# Patient Record
Sex: FEMALE | Race: ASIAN | NOT HISPANIC OR LATINO
[De-identification: names, ages, dates, MRNs, and addresses within clinical notes are randomized per-mention and may not be internally consistent; named-entity substitution may affect disease eponyms.]

---

## 2018-09-10 ENCOUNTER — APPOINTMENT (OUTPATIENT)
Dept: OBGYN | Facility: CLINIC | Age: 33
End: 2018-09-10

## 2018-11-15 ENCOUNTER — RESULT REVIEW (OUTPATIENT)
Age: 33
End: 2018-11-15

## 2020-01-08 ENCOUNTER — RESULT REVIEW (OUTPATIENT)
Age: 35
End: 2020-01-08

## 2020-01-21 ENCOUNTER — RESULT REVIEW (OUTPATIENT)
Age: 35
End: 2020-01-21

## 2021-03-10 ENCOUNTER — RESULT REVIEW (OUTPATIENT)
Age: 36
End: 2021-03-10

## 2022-03-16 ENCOUNTER — RESULT REVIEW (OUTPATIENT)
Age: 37
End: 2022-03-16

## 2023-09-18 ENCOUNTER — APPOINTMENT (OUTPATIENT)
Dept: ANTEPARTUM | Facility: CLINIC | Age: 38
End: 2023-09-18
Payer: COMMERCIAL

## 2023-09-18 ENCOUNTER — ASOB RESULT (OUTPATIENT)
Age: 38
End: 2023-09-18

## 2023-09-18 PROCEDURE — 76801 OB US < 14 WKS SINGLE FETUS: CPT

## 2023-09-18 PROCEDURE — 76813 OB US NUCHAL MEAS 1 GEST: CPT

## 2023-11-07 ENCOUNTER — ASOB RESULT (OUTPATIENT)
Age: 38
End: 2023-11-07

## 2023-11-07 ENCOUNTER — APPOINTMENT (OUTPATIENT)
Dept: ANTEPARTUM | Facility: CLINIC | Age: 38
End: 2023-11-07
Payer: COMMERCIAL

## 2023-11-07 PROCEDURE — 76811 OB US DETAILED SNGL FETUS: CPT

## 2024-01-11 ENCOUNTER — APPOINTMENT (OUTPATIENT)
Dept: ANTEPARTUM | Facility: CLINIC | Age: 39
End: 2024-01-11
Payer: COMMERCIAL

## 2024-01-11 ENCOUNTER — ASOB RESULT (OUTPATIENT)
Age: 39
End: 2024-01-11

## 2024-01-11 PROCEDURE — 76816 OB US FOLLOW-UP PER FETUS: CPT

## 2024-01-11 PROCEDURE — 76819 FETAL BIOPHYS PROFIL W/O NST: CPT | Mod: 59

## 2024-02-29 ENCOUNTER — ASOB RESULT (OUTPATIENT)
Age: 39
End: 2024-02-29

## 2024-02-29 ENCOUNTER — APPOINTMENT (OUTPATIENT)
Dept: ANTEPARTUM | Facility: CLINIC | Age: 39
End: 2024-02-29
Payer: COMMERCIAL

## 2024-02-29 PROCEDURE — 76816 OB US FOLLOW-UP PER FETUS: CPT

## 2024-03-22 ENCOUNTER — ASOB RESULT (OUTPATIENT)
Age: 39
End: 2024-03-22

## 2024-03-22 ENCOUNTER — TRANSCRIPTION ENCOUNTER (OUTPATIENT)
Age: 39
End: 2024-03-22

## 2024-03-22 ENCOUNTER — APPOINTMENT (OUTPATIENT)
Dept: ANTEPARTUM | Facility: CLINIC | Age: 39
End: 2024-03-22
Payer: COMMERCIAL

## 2024-03-22 PROCEDURE — 76819 FETAL BIOPHYS PROFIL W/O NST: CPT | Mod: 59

## 2024-03-22 PROCEDURE — 76816 OB US FOLLOW-UP PER FETUS: CPT

## 2024-03-26 ENCOUNTER — INPATIENT (INPATIENT)
Facility: HOSPITAL | Age: 39
LOS: 2 days | Discharge: HOME | End: 2024-03-29
Attending: OBSTETRICS & GYNECOLOGY | Admitting: OBSTETRICS & GYNECOLOGY
Payer: COMMERCIAL

## 2024-03-26 ENCOUNTER — TRANSCRIPTION ENCOUNTER (OUTPATIENT)
Age: 39
End: 2024-03-26

## 2024-03-26 VITALS — DIASTOLIC BLOOD PRESSURE: 79 MMHG | HEART RATE: 88 BPM | SYSTOLIC BLOOD PRESSURE: 116 MMHG

## 2024-03-26 DIAGNOSIS — Z34.80 ENCOUNTER FOR SUPERVISION OF OTHER NORMAL PREGNANCY, UNSPECIFIED TRIMESTER: ICD-10-CM

## 2024-03-26 DIAGNOSIS — O26.899 OTHER SPECIFIED PREGNANCY RELATED CONDITIONS, UNSPECIFIED TRIMESTER: ICD-10-CM

## 2024-03-26 LAB
BASOPHILS # BLD AUTO: 0.02 K/UL — SIGNIFICANT CHANGE UP (ref 0–0.2)
BASOPHILS NFR BLD AUTO: 0.2 % — SIGNIFICANT CHANGE UP (ref 0–2)
BLD GP AB SCN SERPL QL: NEGATIVE — SIGNIFICANT CHANGE UP
EOSINOPHIL # BLD AUTO: 0.03 K/UL — SIGNIFICANT CHANGE UP (ref 0–0.5)
EOSINOPHIL NFR BLD AUTO: 0.3 % — SIGNIFICANT CHANGE UP (ref 0–6)
HCT VFR BLD CALC: 36.8 % — SIGNIFICANT CHANGE UP (ref 34.5–45)
HGB BLD-MCNC: 12.5 G/DL — SIGNIFICANT CHANGE UP (ref 11.5–15.5)
IMM GRANULOCYTES NFR BLD AUTO: 0.6 % — SIGNIFICANT CHANGE UP (ref 0–0.9)
LYMPHOCYTES # BLD AUTO: 1.49 K/UL — SIGNIFICANT CHANGE UP (ref 1–3.3)
LYMPHOCYTES # BLD AUTO: 13.6 % — SIGNIFICANT CHANGE UP (ref 13–44)
MCHC RBC-ENTMCNC: 30.7 PG — SIGNIFICANT CHANGE UP (ref 27–34)
MCHC RBC-ENTMCNC: 34 GM/DL — SIGNIFICANT CHANGE UP (ref 32–36)
MCV RBC AUTO: 90.4 FL — SIGNIFICANT CHANGE UP (ref 80–100)
MONOCYTES # BLD AUTO: 0.64 K/UL — SIGNIFICANT CHANGE UP (ref 0–0.9)
MONOCYTES NFR BLD AUTO: 5.9 % — SIGNIFICANT CHANGE UP (ref 2–14)
NEUTROPHILS # BLD AUTO: 8.68 K/UL — HIGH (ref 1.8–7.4)
NEUTROPHILS NFR BLD AUTO: 79.4 % — HIGH (ref 43–77)
NRBC # BLD: 0 /100 WBCS — SIGNIFICANT CHANGE UP (ref 0–0)
PLATELET # BLD AUTO: 156 K/UL — SIGNIFICANT CHANGE UP (ref 150–400)
RBC # BLD: 4.07 M/UL — SIGNIFICANT CHANGE UP (ref 3.8–5.2)
RBC # FLD: 13.2 % — SIGNIFICANT CHANGE UP (ref 10.3–14.5)
RH IG SCN BLD-IMP: POSITIVE — SIGNIFICANT CHANGE UP
RH IG SCN BLD-IMP: POSITIVE — SIGNIFICANT CHANGE UP
WBC # BLD: 10.93 K/UL — HIGH (ref 3.8–10.5)
WBC # FLD AUTO: 10.93 K/UL — HIGH (ref 3.8–10.5)

## 2024-03-26 RX ORDER — CHLORHEXIDINE GLUCONATE 213 G/1000ML
1 SOLUTION TOPICAL DAILY
Refills: 0 | Status: DISCONTINUED | OUTPATIENT
Start: 2024-03-26 | End: 2024-03-27

## 2024-03-26 RX ORDER — SODIUM CHLORIDE 9 MG/ML
1000 INJECTION, SOLUTION INTRAVENOUS
Refills: 0 | Status: DISCONTINUED | OUTPATIENT
Start: 2024-03-26 | End: 2024-03-29

## 2024-03-26 RX ORDER — SODIUM CHLORIDE 9 MG/ML
1000 INJECTION, SOLUTION INTRAVENOUS
Refills: 0 | Status: DISCONTINUED | OUTPATIENT
Start: 2024-03-26 | End: 2024-03-27

## 2024-03-26 RX ORDER — OXYTOCIN 10 UNIT/ML
333.33 VIAL (ML) INJECTION
Qty: 20 | Refills: 0 | Status: DISCONTINUED | OUTPATIENT
Start: 2024-03-26 | End: 2024-03-29

## 2024-03-26 RX ORDER — CITRIC ACID/SODIUM CITRATE 300-500 MG
15 SOLUTION, ORAL ORAL EVERY 6 HOURS
Refills: 0 | Status: DISCONTINUED | OUTPATIENT
Start: 2024-03-26 | End: 2024-03-27

## 2024-03-26 RX ADMIN — Medication 0.25 MILLIGRAM(S): at 22:44

## 2024-03-26 RX ADMIN — Medication 0.25 MILLIGRAM(S): at 22:42

## 2024-03-26 RX ADMIN — SODIUM CHLORIDE 125 MILLILITER(S): 9 INJECTION, SOLUTION INTRAVENOUS at 15:00

## 2024-03-26 NOTE — OB PROVIDER H&P - ASSESSMENT
A/P: 37 y/o G 1 P 0 @39.4 wks ([place] 3/329) admitted for IOL with PROM@11a.  - Admit to L&D  - Routine labs, IVF, NPO  - EFM: Cat I, continuous monitoring  - GBS: negative  - IOL with buccal cytotec  - Anesthesia consult  - Discussed with Dr. Garfield Corrigan, PAABEL

## 2024-03-26 NOTE — OB RN TRIAGE NOTE - FALL HARM RISK - UNIVERSAL INTERVENTIONS
Bed in lowest position, wheels locked, appropriate side rails in place/Call bell, personal items and telephone in reach/Instruct patient to call for assistance before getting out of bed or chair/Non-slip footwear when patient is out of bed/[place] to call system/Physically safe environment - no spills, clutter or unnecessary equipment/Purposeful Proactive Rounding/Room/bathroom lighting operational, light cord in reach

## 2024-03-26 NOTE — OB PROVIDER H&P - NS PANP COMMENT GEN_ALL_CORE FT
Ob Attg Note  I agree w/ admission H+P as entered above.  Pt is a primagravida at 39w4d, presenting w/ SROM and in early prodromal labor.  will admit pt for induction of labor, starting w/ buccal misoprostol.  pt and her partner were counseled and all q's were answered.

## 2024-03-26 NOTE — OB RN PATIENT PROFILE - FUNCTIONAL ASSESSMENT - DAILY ACTIVITY 6.
Benefits, risks, and possible complications of procedure explained to patient/caregiver who verbalized understanding and gave written consent. 4 = No assist / stand by assistance

## 2024-03-26 NOTE — OB PROVIDER H&P - HISTORY OF PRESENT ILLNESS
OB PA Admission Note    37 y/o  @39.4wks ([place] 3/29) admitted for IOL with PROM@11am. Pt reports large gush of fluid with persistent trickles of fluid throughout the day. Reports irregular ctx. Denies VB. +FM. GBS negative. EFW 3300g.     PNC: uncomplicated   ObHx: Primigravida   GynHx: Small posterior fibroid. Denies hx of ovarian cysts, abnml PAP smears, STDs  MedHx: Denies hx of HTN, DM, asthma, thyroid problems, blood clots/bleeding problems, hx of blood transfusions  Meds: PNV  All: NKDA  PSHx: Denies hx of abdominal surgery  FHx: Denies hx of blood clots/bleeding problems  Social: Denies alcohol/tobacco/drug use in pregnancy  Psych: Denies hx of anxiety/depression

## 2024-03-26 NOTE — OB RN TRIAGE NOTE - SUICIDE SCREENING QUESTION 1
Feels well  Denies LOF/CTX/VB  No concerns  Discussed fetal kick counting  Does not want to do her vaginal massages despite recommendations 
No

## 2024-03-26 NOTE — OB PROVIDER H&P - NSHPPHYSICALEXAM_GEN_ALL_CORE
Vital Signs Last 24 Hrs  T(C): --  T(F): --  HR: 88 (26 Mar 2024 14:01) (88 - 88)  BP: 116/79 (26 Mar 2024 14:01) (116/79 - 116/79)  BP(mean): --  RR: 16 (26 Mar 2024 14:01) (16 - 16)  SpO2: --    Parameters below as of 26 Mar 2024 14:01  Patient On (Oxygen Delivery Method): room air    Gen: NAD  CV: NRRR  Lungs: CTA  Abd: soft, gravid, non-tender  SSE: +pooling, +nitrazine, +ferning    SVE: 0.5/50/-3  EFM: 120bpm, moderate variability, +accels, -decels  [place]: irregular  BSUS: vtx

## 2024-03-26 NOTE — OB RN PATIENT PROFILE - PRO MENTAL HEALTH SX RECENT
normal,  alert,  in no acute distress,  well developed, well nourished,  ambulating without difficulty,  normal communication ability
none

## 2024-03-27 LAB — T PALLIDUM AB TITR SER: NEGATIVE — SIGNIFICANT CHANGE UP

## 2024-03-27 RX ORDER — NALBUPHINE HYDROCHLORIDE 10 MG/ML
2.5 INJECTION, SOLUTION INTRAMUSCULAR; INTRAVENOUS; SUBCUTANEOUS EVERY 6 HOURS
Refills: 0 | Status: DISCONTINUED | OUTPATIENT
Start: 2024-03-27 | End: 2024-03-28

## 2024-03-27 RX ORDER — OXYCODONE HYDROCHLORIDE 5 MG/1
5 TABLET ORAL ONCE
Refills: 0 | Status: DISCONTINUED | OUTPATIENT
Start: 2024-03-27 | End: 2024-03-29

## 2024-03-27 RX ORDER — MAGNESIUM HYDROXIDE 400 MG/1
30 TABLET, CHEWABLE ORAL
Refills: 0 | Status: DISCONTINUED | OUTPATIENT
Start: 2024-03-27 | End: 2024-03-29

## 2024-03-27 RX ORDER — OXYCODONE HYDROCHLORIDE 5 MG/1
10 TABLET ORAL
Refills: 0 | Status: DISCONTINUED | OUTPATIENT
Start: 2024-03-27 | End: 2024-03-28

## 2024-03-27 RX ORDER — IBUPROFEN 200 MG
600 TABLET ORAL EVERY 6 HOURS
Refills: 0 | Status: COMPLETED | OUTPATIENT
Start: 2024-03-27 | End: 2025-02-23

## 2024-03-27 RX ORDER — TETANUS TOXOID, REDUCED DIPHTHERIA TOXOID AND ACELLULAR PERTUSSIS VACCINE, ADSORBED 5; 2.5; 8; 8; 2.5 [IU]/.5ML; [IU]/.5ML; UG/.5ML; UG/.5ML; UG/.5ML
0.5 SUSPENSION INTRAMUSCULAR ONCE
Refills: 0 | Status: DISCONTINUED | OUTPATIENT
Start: 2024-03-27 | End: 2024-03-29

## 2024-03-27 RX ORDER — LANOLIN
1 OINTMENT (GRAM) TOPICAL EVERY 6 HOURS
Refills: 0 | Status: DISCONTINUED | OUTPATIENT
Start: 2024-03-27 | End: 2024-03-29

## 2024-03-27 RX ORDER — DIPHENHYDRAMINE HCL 50 MG
25 CAPSULE ORAL EVERY 6 HOURS
Refills: 0 | Status: DISCONTINUED | OUTPATIENT
Start: 2024-03-27 | End: 2024-03-29

## 2024-03-27 RX ORDER — KETOROLAC TROMETHAMINE 30 MG/ML
30 SYRINGE (ML) INJECTION EVERY 6 HOURS
Refills: 0 | Status: DISCONTINUED | OUTPATIENT
Start: 2024-03-27 | End: 2024-03-29

## 2024-03-27 RX ORDER — SODIUM CHLORIDE 9 MG/ML
1000 INJECTION, SOLUTION INTRAVENOUS
Refills: 0 | Status: DISCONTINUED | OUTPATIENT
Start: 2024-03-27 | End: 2024-03-29

## 2024-03-27 RX ORDER — HEPARIN SODIUM 5000 [USP'U]/ML
5000 INJECTION INTRAVENOUS; SUBCUTANEOUS EVERY 12 HOURS
Refills: 0 | Status: DISCONTINUED | OUTPATIENT
Start: 2024-03-27 | End: 2024-03-29

## 2024-03-27 RX ORDER — NALOXONE HYDROCHLORIDE 4 MG/.1ML
0.1 SPRAY NASAL
Refills: 0 | Status: DISCONTINUED | OUTPATIENT
Start: 2024-03-27 | End: 2024-03-28

## 2024-03-27 RX ORDER — OXYTOCIN 10 UNIT/ML
333.33 VIAL (ML) INJECTION
Qty: 20 | Refills: 0 | Status: DISCONTINUED | OUTPATIENT
Start: 2024-03-27 | End: 2024-03-29

## 2024-03-27 RX ORDER — OXYCODONE HYDROCHLORIDE 5 MG/1
5 TABLET ORAL
Refills: 0 | Status: DISCONTINUED | OUTPATIENT
Start: 2024-03-27 | End: 2024-03-28

## 2024-03-27 RX ORDER — ACETAMINOPHEN 500 MG
975 TABLET ORAL
Refills: 0 | Status: DISCONTINUED | OUTPATIENT
Start: 2024-03-27 | End: 2024-03-29

## 2024-03-27 RX ORDER — CITRIC ACID/SODIUM CITRATE 300-500 MG
30 SOLUTION, ORAL ORAL ONCE
Refills: 0 | Status: DISCONTINUED | OUTPATIENT
Start: 2024-03-27 | End: 2024-03-29

## 2024-03-27 RX ORDER — DEXAMETHASONE 0.5 MG/5ML
4 ELIXIR ORAL EVERY 6 HOURS
Refills: 0 | Status: DISCONTINUED | OUTPATIENT
Start: 2024-03-27 | End: 2024-03-28

## 2024-03-27 RX ORDER — ONDANSETRON 8 MG/1
4 TABLET, FILM COATED ORAL EVERY 6 HOURS
Refills: 0 | Status: DISCONTINUED | OUTPATIENT
Start: 2024-03-27 | End: 2024-03-28

## 2024-03-27 RX ORDER — SODIUM CHLORIDE 9 MG/ML
500 INJECTION, SOLUTION INTRAVENOUS ONCE
Refills: 0 | Status: DISCONTINUED | OUTPATIENT
Start: 2024-03-27 | End: 2024-03-29

## 2024-03-27 RX ORDER — FAMOTIDINE 10 MG/ML
20 INJECTION INTRAVENOUS ONCE
Refills: 0 | Status: DISCONTINUED | OUTPATIENT
Start: 2024-03-27 | End: 2024-03-29

## 2024-03-27 RX ORDER — MORPHINE SULFATE 50 MG/1
2 CAPSULE, EXTENDED RELEASE ORAL ONCE
Refills: 0 | Status: DISCONTINUED | OUTPATIENT
Start: 2024-03-27 | End: 2024-03-28

## 2024-03-27 RX ORDER — OXYCODONE HYDROCHLORIDE 5 MG/1
5 TABLET ORAL
Refills: 0 | Status: DISCONTINUED | OUTPATIENT
Start: 2024-03-27 | End: 2024-03-29

## 2024-03-27 RX ORDER — SIMETHICONE 80 MG/1
80 TABLET, CHEWABLE ORAL EVERY 4 HOURS
Refills: 0 | Status: DISCONTINUED | OUTPATIENT
Start: 2024-03-27 | End: 2024-03-29

## 2024-03-27 RX ADMIN — ONDANSETRON 4 MILLIGRAM(S): 8 TABLET, FILM COATED ORAL at 10:04

## 2024-03-27 RX ADMIN — Medication 30 MILLIGRAM(S): at 23:37

## 2024-03-27 RX ADMIN — Medication 30 MILLIGRAM(S): at 11:36

## 2024-03-27 RX ADMIN — Medication 975 MILLIGRAM(S): at 10:45

## 2024-03-27 RX ADMIN — Medication 975 MILLIGRAM(S): at 16:27

## 2024-03-27 RX ADMIN — Medication 30 MILLIGRAM(S): at 18:09

## 2024-03-27 RX ADMIN — Medication 975 MILLIGRAM(S): at 21:40

## 2024-03-27 RX ADMIN — Medication 30 MILLIGRAM(S): at 12:30

## 2024-03-27 RX ADMIN — HEPARIN SODIUM 5000 UNIT(S): 5000 INJECTION INTRAVENOUS; SUBCUTANEOUS at 21:08

## 2024-03-27 RX ADMIN — Medication 975 MILLIGRAM(S): at 10:05

## 2024-03-27 RX ADMIN — Medication 975 MILLIGRAM(S): at 21:07

## 2024-03-27 NOTE — OB RN DELIVERY SUMMARY - NS_SEPSISRSKCALC_OBGYN_ALL_OB_FT
Rupture of membranes must be entered above.   EOS calculated successfully. EOS Risk Factor: 0.5/1000 live births ([facility] national incidence); GA=39w5d; Temp=98.6; ROM=7.2; GBS='Negative'; Antibiotics='No antibiotics or any antibiotics < 2 hrs prior to birth'

## 2024-03-27 NOTE — OB NEONATOLOGY/PEDIATRICIAN DELIVERY SUMMARY - NSPEDSNEONOTESA_OBGYN_ALL_OB_FT
Requested by OB to attend this primary unscheduled  delivery at 39.5 weeks for category II tracing. Mother is a 38 year old,  , blood type  O  pos.  Prenatal labs as follow: HIV neg, RPR non-reactive, rubella immune, HBsA neg, GBS neg on 3/8/24.  Maternal history significant for small posterior fibroid. Prenatal history significant for none. This pregnancy was complicated by AMA. SROM at 11:00 AM on 3/26 - with clear fluid - ~ 19 hours prior to delivery. Baby emerged prior to peds arrival. Evaluated under warmer. Dried, suctioned and stimulated. Apgars 9/9. Infant admitted to NBN for routine care. Parents updated in L&D. EOS 0.18.

## 2024-03-27 NOTE — OB PROVIDER LABOR PROGRESS NOTE - ASSESSMENT
Pt is a 37yo  admitted for IOL for PROM now with ISE in place.    - Continue cont EFM, toco, IVF  - Continue resuscitation and maternal repositioning  - LR 500cc bolus    D/w Dr. Garfield Garcia MD PGY1
A/P 38y P0 @ 39/4 wks IOL PROM@11a  -IOL: Continue on Buccal Cytotec as tracing allows   -Cat 1 tracing  -Analgesia : Epidural desired as patient is very uncomfortable   -Anticipate     d/w Dr. Merrill in house   Michell West PGY-4  
Pt is a 39yo  admitted for IOL for PROM.    - Continue cont EFM, toco, IVF  - sp terbutaline x2    Dr. Antonio in room during evaluation    Maryjo Garcia MD PGY1
Plan: 38y y/o  @ 39w4d. IOL for PROM.   - s/p ephedrine by anesthesia for low BPs (80-90s/40-50s)  - Continuous EFM, [place]  - IVF bolus    d/w attending physician Dr. Garfield Abreu MD  PGY-2

## 2024-03-27 NOTE — OB RN DELIVERY SUMMARY - AMNIOTIC FLUID AMOUNT, LABOR
[FreeTextEntry1] : May 2019 he had been concerned about a lump on the posterior right shoulder.\par Clinically 2 cm, fleshy, mobile, and nontender.\par \par \par Rx for repeat left thigh MRI ordered for surveillance\par \par \par 08-01-20:\par Left lower extremity MRI at 450.\par No change since October 2016.\par Stable mild enlargement of the mid to distal left sciatic nerve at site of prior schwannoma, presumably related to postsurgical changes.\par August 7, 20/20, we spoke.\par Review of the above information.\par He will see me either later this year, or early next year.\par \par \par 3/26/2021.\par I called the patient but had to leave a message on his mailbox, and home number.\par Earlier this week the patient had seen Dr. Jensen Nguyen.\par The patient apparently has a tender lump on the outer right arm.\par My office will call to schedule a follow-up visit with us.
within normal limits

## 2024-03-27 NOTE — OB PROVIDER DELIVERY SUMMARY - NSPROVIDERDELIVERYNOTE_OBGYN_ALL_OB_FT
Non-scheduled urgent pLTCS for category 2 tracing  Viable vertex male infant, apgars 9/9  Hysterotomy closed in 1 layer using vicryl  Grossly normal uterus, tubes, and ovaries  Abdomen closed in standard fashion  Pt and infant to recovery in stable condition  Dictation #  QBL: 314 mL        IVF: 1500 mL        UOP: 150 mL Non-scheduled urgent pLTCS for category 2 tracing  Viable vertex male infant, apgars 9/9  Hysterotomy closed in 1 layer using vicryl  Grossly normal uterus, tubes, and ovaries  Abdomen closed in standard fashion  Pt and infant to recovery in stable condition  Dictation #[id number]  QBL: 314 mL        IVF: 1500 mL        UOP: 150 mL Non-scheduled urgent pLTCS for category 2 tracing  Viable vertex male infant, apgars 9/9  Hysterotomy closed in 1 layer using vicryl  Grossly normal uterus, tubes, and ovaries  Abdomen closed in standard fashion  Pt and infant to recovery in stable condition  Dictation #[id number]  QBL: 314 mL        IVF: 1500 mL        UOP: 150 mL    Ob Attg Note  I agree w/ above.  uncomplicated primary CS delivery

## 2024-03-27 NOTE — OB PROVIDER DELIVERY SUMMARY - NSSELHIDDEN_OBGYN_ALL_OB_FT
[NS_DeliveryAttending1_OBGYN_ALL_OB_FT:[id number]==],[NS_DeliveryRN_OBGYN_ALL_OB_FT:[id number]],[NS_DeliveryAssist1_OBGYN_ALL_OB_FT:[id number]=]

## 2024-03-27 NOTE — OB PROVIDER DELIVERY SUMMARY - NSNUMBEROFNEWBORNS_OBGYN_ALL_OB_NU
Patient Instructions by Elvia Moon MD at 05/09/18 03:34 PM     Author:  Elvia Moon MD Service:  (none) Author Type:  Physician     Filed:  05/09/18 03:35 PM Encounter Date:  5/9/2018 Status:  Signed     :  Elvia Moon MD (Physician)            Risks, benefits, and side effects of medication discussed with patient  Encouraged patient to continue with psychotherapy  Call clinic as needed [phone]  For acute crisis or suicidal ideation call crisis line or go to emergency department  Return to clinic in 2 weeks     Additional Educational Resources:  For additional resources regarding your symptoms, diagnosis, or further health information, please visit the Health Resources section on [email or web address] or the Online Health Resources section in [email or web address].              Revision History        User Key Date/Time User Provider Type Action    > [N/A] 05/09/18 03:35 PM Elvia Moon MD Physician Sign             1

## 2024-03-27 NOTE — OB RN DELIVERY SUMMARY - NSSELHIDDEN_OBGYN_ALL_OB_FT
[NS_DeliveryAttending1_OBGYN_ALL_OB_FT:[id number]==],[NS_DeliveryRN_OBGYN_ALL_OB_FT:[id number]=]

## 2024-03-27 NOTE — OB PROVIDER LABOR PROGRESS NOTE - NS_SUBJECTIVE/OBJECTIVE_OBGYN_ALL_OB_FT
R4 Labor Note    S: Patient evaluated at bedside for cervical change.     O:  T(C): 37.0 (03-26-24 @ 18:48), Max: 37.0 (03-26-24 @ 18:48)  HR: 65 (03-26-24 @ 19:13) (62 - 88)  BP: 134/72 (03-26-24 @ 18:45) (116/79 - 134/72)  RR: 16 (03-26-24 @ 14:19) (16 - 16)  SpO2: 100% (03-26-24 @ 19:13) (98% - 100%)
R2 Labor & Delivery Progress Note     Pt seen & examined at bedside for vaginal exam. Patient immediately post epidural.    T(C): 37 (03-26-24 @ 19:35), Max: 37.0 (03-26-24 @ 18:48)  HR: 71 (03-26-24 @ 20:10) (57 - 88)  BP: 102/54 (03-26-24 @ 20:10) (93/50 - 134/72)  RR: 16 (03-26-24 @ 19:35) (16 - 16)  SpO2: 100% (03-26-24 @ 20:06) (91% - 100%)
S:  Pt seen and examined for 4min prolonged deceleration. Pt repositioned and resuscitated. Uterus found to be firm. Terbutaline x1 administered. Uterus still found to be firm with deceleration persisting. One additional dose terbutaline administered. FHR returned to baseline    O:  Vital Signs Last 24 Hrs  T(C): 37.0 (26 Mar 2024 21:44), Max: 37.0 (26 Mar 2024 18:48)  T(F): 98.6 (26 Mar 2024 21:44), Max: 98.6 (26 Mar 2024 18:48)  HR: 69 (26 Mar 2024 22:46) (57 - 88)  BP: 108/55 (26 Mar 2024 22:46) (87/42 - 138/61)  BP(mean): --  RR: 16 (26 Mar 2024 21:44) (16 - 16)  SpO2: 100% (26 Mar 2024 22:46) (91% - 100%)    Parameters below as of 26 Mar 2024 14:19  Patient On (Oxygen Delivery Method): room air
S:  Pt seen and examined for 4min prolonged deceleration. Pt repositioned and 500cc LR bolus given. ISE placed in usual fashion.    O:  Vital Signs Last 24 Hrs  T(C): 37.0 (27 Mar 2024 01:00), Max: 37.0 (26 Mar 2024 18:48)  T(F): 98.6 (27 Mar 2024 01:00), Max: 98.6 (26 Mar 2024 18:48)  HR: 67 (27 Mar 2024 04:57) (57 - 111)  BP: 103/53 (27 Mar 2024 04:50) (85/47 - 138/61)  BP(mean): --  RR: 16 (27 Mar 2024 01:00) (16 - 16)  SpO2: 99% (27 Mar 2024 04:57) (91% - 100%)    Parameters below as of 26 Mar 2024 14:19  Patient On (Oxygen Delivery Method): room air

## 2024-03-27 NOTE — OB RN INTRAOPERATIVE NOTE - NSSELHIDDEN_OBGYN_ALL_OB_FT
[NS_DeliveryAttending1_OBGYN_ALL_OB_FT:[id number]==],[NS_DeliveryRN_OBGYN_ALL_OB_FT:[id number]] [NS_DeliveryAttending1_OBGYN_ALL_OB_FT:[id number]],[NS_DeliveryRN_OBGYN_ALL_OB_FT:[id number]],[NS_DeliveryAssist1_OBGYN_ALL_OB_FT:[id number]=]

## 2024-03-27 NOTE — OB PROVIDER LABOR PROGRESS NOTE - NS_OBIHIFHRDETAILS_OBGYN_ALL_OB_FT
Baseline 120, mod variability, +accels, -decels
5-6 min late onset decel
Baseline: 130 bpm, moderate variability,  + accels, + 4min prolonged decel
Baseline: 150 bpm, moderate variability,  - accels, + 4min decel

## 2024-03-28 ENCOUNTER — TRANSCRIPTION ENCOUNTER (OUTPATIENT)
Age: 39
End: 2024-03-28

## 2024-03-28 LAB
BASOPHILS # BLD AUTO: 0.02 K/UL — SIGNIFICANT CHANGE UP (ref 0–0.2)
BASOPHILS NFR BLD AUTO: 0.2 % — SIGNIFICANT CHANGE UP (ref 0–2)
EOSINOPHIL # BLD AUTO: 0.05 K/UL — SIGNIFICANT CHANGE UP (ref 0–0.5)
EOSINOPHIL NFR BLD AUTO: 0.4 % — SIGNIFICANT CHANGE UP (ref 0–6)
HCT VFR BLD CALC: 29.5 % — LOW (ref 34.5–45)
HGB BLD-MCNC: 9.8 G/DL — LOW (ref 11.5–15.5)
IMM GRANULOCYTES NFR BLD AUTO: 0.6 % — SIGNIFICANT CHANGE UP (ref 0–0.9)
LYMPHOCYTES # BLD AUTO: 19.5 % — SIGNIFICANT CHANGE UP (ref 13–44)
LYMPHOCYTES # BLD AUTO: 2.43 K/UL — SIGNIFICANT CHANGE UP (ref 1–3.3)
MCHC RBC-ENTMCNC: 30.8 PG — SIGNIFICANT CHANGE UP (ref 27–34)
MCHC RBC-ENTMCNC: 33.2 GM/DL — SIGNIFICANT CHANGE UP (ref 32–36)
MCV RBC AUTO: 92.8 FL — SIGNIFICANT CHANGE UP (ref 80–100)
MONOCYTES # BLD AUTO: 0.74 K/UL — SIGNIFICANT CHANGE UP (ref 0–0.9)
MONOCYTES NFR BLD AUTO: 5.9 % — SIGNIFICANT CHANGE UP (ref 2–14)
NEUTROPHILS # BLD AUTO: 9.13 K/UL — HIGH (ref 1.8–7.4)
NEUTROPHILS NFR BLD AUTO: 73.4 % — SIGNIFICANT CHANGE UP (ref 43–77)
NRBC # BLD: 0 /100 WBCS — SIGNIFICANT CHANGE UP (ref 0–0)
PLATELET # BLD AUTO: 126 K/UL — LOW (ref 150–400)
RBC # BLD: 3.18 M/UL — LOW (ref 3.8–5.2)
RBC # FLD: 13.4 % — SIGNIFICANT CHANGE UP (ref 10.3–14.5)
WBC # BLD: 12.45 K/UL — HIGH (ref 3.8–10.5)
WBC # FLD AUTO: 12.45 K/UL — HIGH (ref 3.8–10.5)

## 2024-03-28 RX ORDER — IBUPROFEN 200 MG
600 TABLET ORAL EVERY 6 HOURS
Refills: 0 | Status: DISCONTINUED | OUTPATIENT
Start: 2024-03-28 | End: 2024-03-29

## 2024-03-28 RX ORDER — LANOLIN
1 OINTMENT (GRAM) TOPICAL
Qty: 0 | Refills: 0 | DISCHARGE
Start: 2024-03-28

## 2024-03-28 RX ORDER — ACETAMINOPHEN 500 MG
3 TABLET ORAL
Qty: 0 | Refills: 0 | DISCHARGE
Start: 2024-03-28

## 2024-03-28 RX ORDER — IBUPROFEN 200 MG
1 TABLET ORAL
Qty: 0 | Refills: 0 | DISCHARGE
Start: 2024-03-28

## 2024-03-28 RX ADMIN — Medication 975 MILLIGRAM(S): at 16:20

## 2024-03-28 RX ADMIN — Medication 30 MILLIGRAM(S): at 12:41

## 2024-03-28 RX ADMIN — Medication 975 MILLIGRAM(S): at 15:50

## 2024-03-28 RX ADMIN — HEPARIN SODIUM 5000 UNIT(S): 5000 INJECTION INTRAVENOUS; SUBCUTANEOUS at 21:59

## 2024-03-28 RX ADMIN — Medication 975 MILLIGRAM(S): at 03:20

## 2024-03-28 RX ADMIN — Medication 30 MILLIGRAM(S): at 18:20

## 2024-03-28 RX ADMIN — Medication 30 MILLIGRAM(S): at 05:40

## 2024-03-28 RX ADMIN — Medication 975 MILLIGRAM(S): at 10:04

## 2024-03-28 RX ADMIN — Medication 975 MILLIGRAM(S): at 21:30

## 2024-03-28 RX ADMIN — Medication 975 MILLIGRAM(S): at 02:35

## 2024-03-28 RX ADMIN — Medication 30 MILLIGRAM(S): at 13:11

## 2024-03-28 RX ADMIN — HEPARIN SODIUM 5000 UNIT(S): 5000 INJECTION INTRAVENOUS; SUBCUTANEOUS at 10:04

## 2024-03-28 RX ADMIN — Medication 975 MILLIGRAM(S): at 20:48

## 2024-03-28 RX ADMIN — Medication 600 MILLIGRAM(S): at 23:46

## 2024-03-28 RX ADMIN — Medication 975 MILLIGRAM(S): at 10:34

## 2024-03-28 RX ADMIN — Medication 30 MILLIGRAM(S): at 05:10

## 2024-03-28 RX ADMIN — Medication 30 MILLIGRAM(S): at 00:06

## 2024-03-28 NOTE — DISCHARGE NOTE OB - PATIENT PORTAL LINK FT
You can access the FollowMyHealth Patient Portal offered by [facility] by registering at the following website: [email or web address]. By joining [facility]’s FollowMyHealth portal, you will also be able to view your health information using other applications (apps) compatible with our system.

## 2024-03-28 NOTE — DISCHARGE NOTE OB - ADDITIONAL INSTRUCTIONS
Postpartum visit via telemed at 2 weeks and then an in office visit between 4-6 weeks.  Call for any issues or concerns. Please do not drive unless you are completely pain free or not requiring any narcotics. Postpartum office visit between 4-6 weeks.  Call for any issues or concerns. Please do not drive unless you are completely pain free or not requiring any narcotics.

## 2024-03-28 NOTE — PROGRESS NOTE ADULT - ASSESSMENT
A/P: 37yo POD#1 s/p pLTCS due to cat II.  Patient is stable and doing well post-operatively.    - Continue regular diet  - Increase ambulation  - Continue motrin, tylenol, oxycodone PRN for pain control.    - AM H/H stable    Keyla Anderson MD PGY1

## 2024-03-28 NOTE — PROGRESS NOTE ADULT - SUBJECTIVE AND OBJECTIVE BOX
OB Progress Note:  Delivery, POD#1    S: 39yo POD#1 s/p pLTCS due to cat II . Pain well controlled, tolerating regular diet, not yet passing flatus, ambulating without difficulty, voiding spontaneously. Denies heavy vaginal bleeding, N/V, CP/SOB/lightheadedness/dizziness.     O:   Vital Signs Last 24 Hrs  T(C): 36.7 (28 Mar 2024 05:25), Max: 36.9 (27 Mar 2024 07:00)  T(F): 98.1 (28 Mar 2024 05:25), Max: 98.4 (27 Mar 2024 07:00)  HR: 65 (28 Mar 2024 05:25) (56 - 80)  BP: 99/63 (28 Mar 2024 05:25) (99/63 - 137/60)  BP(mean): 86 (27 Mar 2024 09:00) (77 - 86)  RR: 18 (28 Mar 2024 05:25) (16 - 18)  SpO2: 99% (28 Mar 2024 05:25) (95% - 99%)    Parameters below as of 28 Mar 2024 05:25  Patient On (Oxygen Delivery Method): room air        Labs:  Blood type: O Positive  Rubella IgG: RPR: Negative                          9.8<L>   12.45<H> >-----------< 126<L>    (  @ 06:21 )             29.5<L>                        12.5   10.93<H> >-----------< 156    (  @ 15:24 )             36.8                  PE:  General: NAD  CV: RRR  Resp: CTAB  Abdomen: Mildly distended, generalized tenderness upon palpation of abd, Fundus firm, incision c/d/i.  : Nl lochia  Extremities: No erythema, no pitting edema     OB Progress Note:  Delivery, POD#1    S: 37yo POD#1 s/p pLTCS due to cat II . Pain well controlled, tolerating regular diet, not yet passing flatus, ambulating without difficulty, voiding spontaneously. Denies heavy vaginal bleeding, N/V, CP/SOB/lightheadedness/dizziness.     O:   Vital Signs Last 24 Hrs  T(C): 36.7 (28 Mar 2024 05:25), Max: 36.9 (27 Mar 2024 07:00)  T(F): 98.1 (28 Mar 2024 05:25), Max: 98.4 (27 Mar 2024 07:00)  HR: 65 (28 Mar 2024 05:25) (56 - 80)  BP: 99/63 (28 Mar 2024 05:25) (99/63 - 137/60)  BP(mean): 86 (27 Mar 2024 09:00) (77 - 86)  RR: 18 (28 Mar 2024 05:25) (16 - 18)  SpO2: 99% (28 Mar 2024 05:25) (95% - 99%)    Parameters below as of 28 Mar 2024 05:25  Patient On (Oxygen Delivery Method): room air        Labs:  Blood type: O Positive  Rubella IgG: RPR: Negative                          9.8<L>   12.45<H> >-----------< 126<L>    (  @ 06:21 )             29.5<L>                        12.5   10.93<H> >-----------< 156    (  @ 15:24 )             36.8                  PE:  General: NAD  CV: RRR  Resp: CTAB  Abdomen: Mildly distended, appropriately tender, Fundus firm, incision c/d/i.  : Nl lochia  Extremities: No erythema, no pitting edema

## 2024-03-28 NOTE — DISCHARGE NOTE OB - HOSPITAL COURSE
Pt underwent a C/S without any complications. Her postpartum course was uneventful. She met all her milestones in regards to her vitals, postpartum labs, diet, ambulation, pain management. Follow up discussed. Pt was discharged home on POD#3  After discharge, please stay on pelvic rest for 6 weeks, meaning no sexual intercourse, no tampons and no douching.  No driving for 2 weeks as women can loose a lot of blood during delivery and there is a possibility of being lightheaded/fainting.  No lifting objects heavier than baby for two weeks.  Expect to have vaginal bleeding/spotting for up to six weeks.  The bleeding should get lighter and more white/light brown with time.  For bleeding soaking more than a pad an hour or passing clots greater than the size of your fist, come in to the emergency department.  Follow up over telemed in 1 week for incision check.  Call clinic for noticeable increase in redness or swelling at incision, discharge from incision, or opening of skin at incision site. Follow up for a postpartum visit in 5 weeks.     Pt underwent a C/S without any complications. Her postpartum course was uneventful. She met all her milestones in regards to her vitals, postpartum labs, diet, ambulation, pain management. Follow up discussed. Pt was discharged home on POD#2.

## 2024-03-28 NOTE — DISCHARGE NOTE OB - MEDICATION SUMMARY - MEDICATIONS TO TAKE
I will START or STAY ON the medications listed below when I get home from the hospital:    ibuprofen 600 mg oral tablet  -- 1 tab(s) by mouth every 6 hours  -- Indication: For pain, cramps or fever    acetaminophen 325 mg oral tablet  -- 3 tab(s) by mouth every 6 hours as needed for  moderate pain  -- Indication: For pain, camps or fever    lanolin topical ointment  -- 1 Apply on skin to affected area every 6 hours As needed Sore Nipples  -- Indication: For Cracked or sore nipples

## 2024-03-28 NOTE — DISCHARGE NOTE OB - CARE PROVIDER_API CALL
Tom Merrill  Obstetrics and Gynecology  [address], [address]  [place] [address]  Phone: [phone]  Fax: [phone]  Follow Up Time:

## 2024-03-28 NOTE — DISCHARGE NOTE OB - MATERIALS PROVIDED
34
Vaccinations/[facility]  Screening Program/  Immunization Record/Breastfeeding Mother’s Support Group Information/Guide to Postpartum Care/[facility] Hearing Screen Program/Back To Sleep Handout/Shaken Baby Prevention Handout/Breastfeeding Guide and Packet/Birth Certificate Instructions/Discharge Medication Information for Patients and Families Pocket Guide

## 2024-03-28 NOTE — DISCHARGE NOTE OB - PLAN OF CARE
See below Patient is stable and doing well upon discharge from the hospital.  She has been counseled regarding postpartum care, modification of her activities and pain management.   She will be seen at outpatient ob/gyn office for postpartum check next month, or sooner if needed.

## 2024-03-28 NOTE — DISCHARGE NOTE OB - NS MD DC FALL RISK RISK
For information on Fall & Injury Prevention, visit: [email or web address] OR  [email or web address] OR  https://www.cdc.gov/steadi/patient.html

## 2024-03-28 NOTE — DISCHARGE NOTE OB - CARE PLAN
Principal Discharge DX:	 delivery delivered  Assessment and plan of treatment:	See below   1 Principal Discharge DX:	 delivery delivered  Assessment and plan of treatment:	Patient is stable and doing well upon discharge from the hospital.  She has been counseled regarding postpartum care, modification of her activities and pain management.   She will be seen at outpatient ob/gyn office for postpartum check next month, or sooner if needed.

## 2024-03-29 ENCOUNTER — APPOINTMENT (OUTPATIENT)
Dept: ANTEPARTUM | Facility: CLINIC | Age: 39
End: 2024-03-29

## 2024-03-29 VITALS
RESPIRATION RATE: 18 BRPM | SYSTOLIC BLOOD PRESSURE: 128 MMHG | HEART RATE: 60 BPM | DIASTOLIC BLOOD PRESSURE: 76 MMHG | OXYGEN SATURATION: 97 % | TEMPERATURE: 98 F

## 2024-03-29 PROCEDURE — 86901 BLOOD TYPING SEROLOGIC RH(D): CPT

## 2024-03-29 PROCEDURE — 59025 FETAL NON-STRESS TEST: CPT

## 2024-03-29 PROCEDURE — 86900 BLOOD TYPING SEROLOGIC ABO: CPT

## 2024-03-29 PROCEDURE — 86850 RBC ANTIBODY SCREEN: CPT

## 2024-03-29 PROCEDURE — 86780 TREPONEMA PALLIDUM: CPT

## 2024-03-29 PROCEDURE — 85025 COMPLETE CBC W/AUTO DIFF WBC: CPT

## 2024-03-29 PROCEDURE — 59050 FETAL MONITOR W/REPORT: CPT

## 2024-03-29 RX ADMIN — Medication 600 MILLIGRAM(S): at 12:35

## 2024-03-29 RX ADMIN — Medication 600 MILLIGRAM(S): at 00:30

## 2024-03-29 RX ADMIN — Medication 975 MILLIGRAM(S): at 03:30

## 2024-03-29 RX ADMIN — Medication 600 MILLIGRAM(S): at 05:56

## 2024-03-29 RX ADMIN — Medication 600 MILLIGRAM(S): at 06:33

## 2024-03-29 RX ADMIN — Medication 600 MILLIGRAM(S): at 11:36

## 2024-03-29 RX ADMIN — Medication 975 MILLIGRAM(S): at 08:35

## 2024-03-29 RX ADMIN — Medication 975 MILLIGRAM(S): at 02:49

## 2024-03-29 RX ADMIN — Medication 975 MILLIGRAM(S): at 09:20

## 2024-03-29 NOTE — PROGRESS NOTE ADULT - ATTENDING COMMENTS
ob attg note  Pt is stable and doing well.  VSS  abd is soft, NT, ND.  incision is c/d/i.  I agree w resident's note  pt is ready to be discharged today.    home care and f/u discussed and all q's anwered.
I have personally seen, examined, and participated in the care of this patient. I have reviewed all pertinent clinical information, including history, physical exam, plan, and the Resident 's note and agree except as noted.                abd- soft, nontender  incision- healing well. prineo intact               uterus- nontender, firm , below umbilicus               lochia- mild.               ext- no cords.  a/p  S/P C/S         Increase OOB         Regular diet         PO Pain protocol         Routine Postpartum Care

## 2024-03-29 NOTE — PROGRESS NOTE ADULT - ASSESSMENT
A/P: 39yo POD#2 s/p pLTCS for cat2 tracing.  Patient is stable and doing well post-operatively.      #postpartum care  - Continue regular diet.  - Increase ambulation.  - HSQ, venodynes for DVT prophylaxis  - Continue motrin, tylenol, oxycodone PRN for pain control      Amparo Oates, PGY1

## 2024-03-29 NOTE — PROGRESS NOTE ADULT - SUBJECTIVE AND OBJECTIVE BOX
OB Progress Note: LTCS, POD#2    S: 39yo on POD#2 s/p pLTCS for cat2 tracing. Pain is well controlled. She is tolerating a regular diet and passing flatus. She is voiding spontaneously, and ambulating without difficulty. Denies chest pain, shortness of breath. Denies headaches, blurry vision, epigastric pain, weakness. Denies nausea/vomiting.    O:  Vitals:  Vital Signs Last 24 Hrs  T(C): 36.7 (29 Mar 2024 06:25), Max: 37 (28 Mar 2024 13:15)  T(F): 98.1 (29 Mar 2024 06:25), Max: 98.6 (28 Mar 2024 13:15)  HR: 60 (29 Mar 2024 06:25) (60 - 69)  BP: 128/76 (29 Mar 2024 06:25) (113/68 - 128/76)  BP(mean): --  RR: 18 (29 Mar 2024 06:25) (18 - 18)  SpO2: 97% (29 Mar 2024 06:25) (97% - 97%)    Parameters below as of 29 Mar 2024 06:25  Patient On (Oxygen Delivery Method): room air        MEDICATIONS  (STANDING):  acetaminophen     Tablet .. 975 milliGRAM(s) Oral <User Schedule>  citric acid/sodium citrate Solution 30 milliLiter(s) Oral once  dextrose 5% + lactated ringers. 1000 milliLiter(s) (125 mL/Hr) IV Continuous <Continuous>  diphtheria/tetanus/pertussis (acellular) Vaccine (Adacel) 0.5 milliLiter(s) IntraMuscular once  famotidine Injectable 20 milliGRAM(s) IV Push once  heparin   Injectable 5000 Unit(s) SubCutaneous every 12 hours  ibuprofen  Tablet. 600 milliGRAM(s) Oral every 6 hours  lactated ringers Bolus 500 milliLiter(s) IV Bolus once  lactated ringers. 1000 milliLiter(s) (125 mL/Hr) IV Continuous <Continuous>  oxytocin Infusion 333.333 milliUNIT(s)/Min (1000 mL/Hr) IV Continuous <Continuous>  oxytocin Infusion 333.333 milliUNIT(s)/Min (1000 mL/Hr) IV Continuous <Continuous>      MEDICATIONS  (PRN):  diphenhydrAMINE 25 milliGRAM(s) Oral every 6 hours PRN Pruritus  lanolin Ointment 1 Application(s) Topical every 6 hours PRN Sore Nipples  magnesium hydroxide Suspension 30 milliLiter(s) Oral two times a day PRN Constipation  oxyCODONE    IR 5 milliGRAM(s) Oral every 3 hours PRN Moderate to Severe Pain (4-10)  oxyCODONE    IR 5 milliGRAM(s) Oral once PRN Moderate to Severe Pain (4-10)  simethicone 80 milliGRAM(s) Chew every 4 hours PRN Gas      Labs:  Blood type: O Positive  Rubella IgG: RPR: Negative                          9.8<L>   12.45<H> >-----------< 126<L>    ( 03-28 @ 06:21 )             29.5<L>                        12.5   10.93<H> >-----------< 156    ( 03-26 @ 15:24 )             36.8                  PE:  General: NAD  Abdomen: Soft, appropriately tender, incision c/d/i.  Extremities: No erythema, no pitting edema

## 2024-03-31 ENCOUNTER — INPATIENT (INPATIENT)
Facility: HOSPITAL | Age: 39
LOS: 1 days | Discharge: HOME | DRG: 776 | End: 2024-04-02
Attending: OBSTETRICS & GYNECOLOGY | Admitting: OBSTETRICS & GYNECOLOGY
Payer: COMMERCIAL

## 2024-03-31 VITALS
DIASTOLIC BLOOD PRESSURE: 84 MMHG | HEIGHT: 60 IN | TEMPERATURE: 98 F | OXYGEN SATURATION: 97 % | RESPIRATION RATE: 20 BRPM | SYSTOLIC BLOOD PRESSURE: 156 MMHG | HEART RATE: 62 BPM

## 2024-03-31 DIAGNOSIS — Z98.891 HISTORY OF UTERINE SCAR FROM PREVIOUS SURGERY: Chronic | ICD-10-CM

## 2024-03-31 LAB
ALBUMIN SERPL ELPH-MCNC: 3.6 G/DL — SIGNIFICANT CHANGE UP (ref 3.3–5)
ALP SERPL-CCNC: 213 U/L — HIGH (ref 40–120)
ALT FLD-CCNC: 40 U/L — SIGNIFICANT CHANGE UP (ref 10–45)
ANION GAP SERPL CALC-SCNC: 15 MMOL/L — SIGNIFICANT CHANGE UP (ref 5–17)
APPEARANCE UR: CLEAR — SIGNIFICANT CHANGE UP
APTT BLD: 28.1 SEC — SIGNIFICANT CHANGE UP (ref 24.5–35.6)
AST SERPL-CCNC: 32 U/L — SIGNIFICANT CHANGE UP (ref 10–40)
BACTERIA # UR AUTO: NEGATIVE /HPF — SIGNIFICANT CHANGE UP
BASOPHILS # BLD AUTO: 0.03 K/UL — SIGNIFICANT CHANGE UP (ref 0–0.2)
BASOPHILS NFR BLD AUTO: 0.4 % — SIGNIFICANT CHANGE UP (ref 0–2)
BILIRUB SERPL-MCNC: 0.2 MG/DL — SIGNIFICANT CHANGE UP (ref 0.2–1.2)
BILIRUB UR-MCNC: NEGATIVE — SIGNIFICANT CHANGE UP
BUN SERPL-MCNC: 14 MG/DL — SIGNIFICANT CHANGE UP (ref 7–23)
CALCIUM SERPL-MCNC: 9.4 MG/DL — SIGNIFICANT CHANGE UP (ref 8.4–10.5)
CAST: 0 /LPF — SIGNIFICANT CHANGE UP (ref 0–4)
CHLORIDE SERPL-SCNC: 104 MMOL/L — SIGNIFICANT CHANGE UP (ref 96–108)
CO2 SERPL-SCNC: 19 MMOL/L — LOW (ref 22–31)
COLOR SPEC: YELLOW — SIGNIFICANT CHANGE UP
CREAT SERPL-MCNC: 0.62 MG/DL — SIGNIFICANT CHANGE UP (ref 0.5–1.3)
DIFF PNL FLD: ABNORMAL
EGFR: 118 ML/MIN/1.73M2 — SIGNIFICANT CHANGE UP
EOSINOPHIL # BLD AUTO: 0.19 K/UL — SIGNIFICANT CHANGE UP (ref 0–0.5)
EOSINOPHIL NFR BLD AUTO: 2.3 % — SIGNIFICANT CHANGE UP (ref 0–6)
FIBRINOGEN PPP-MCNC: 513 MG/DL — HIGH (ref 200–445)
GLUCOSE SERPL-MCNC: 89 MG/DL — SIGNIFICANT CHANGE UP (ref 70–99)
GLUCOSE UR QL: NEGATIVE MG/DL — SIGNIFICANT CHANGE UP
HCT VFR BLD CALC: 32.4 % — LOW (ref 34.5–45)
HGB BLD-MCNC: 10.7 G/DL — LOW (ref 11.5–15.5)
IMM GRANULOCYTES NFR BLD AUTO: 0.5 % — SIGNIFICANT CHANGE UP (ref 0–0.9)
INR BLD: 0.85 RATIO — SIGNIFICANT CHANGE UP (ref 0.85–1.18)
KETONES UR-MCNC: NEGATIVE MG/DL — SIGNIFICANT CHANGE UP
LDH SERPL L TO P-CCNC: 203 U/L — SIGNIFICANT CHANGE UP (ref 50–242)
LEUKOCYTE ESTERASE UR-ACNC: ABNORMAL
LYMPHOCYTES # BLD AUTO: 2.19 K/UL — SIGNIFICANT CHANGE UP (ref 1–3.3)
LYMPHOCYTES # BLD AUTO: 27 % — SIGNIFICANT CHANGE UP (ref 13–44)
MAGNESIUM SERPL-MCNC: 1.8 MG/DL — SIGNIFICANT CHANGE UP (ref 1.6–2.6)
MCHC RBC-ENTMCNC: 30.7 PG — SIGNIFICANT CHANGE UP (ref 27–34)
MCHC RBC-ENTMCNC: 33 GM/DL — SIGNIFICANT CHANGE UP (ref 32–36)
MCV RBC AUTO: 93.1 FL — SIGNIFICANT CHANGE UP (ref 80–100)
MONOCYTES # BLD AUTO: 0.58 K/UL — SIGNIFICANT CHANGE UP (ref 0–0.9)
MONOCYTES NFR BLD AUTO: 7.2 % — SIGNIFICANT CHANGE UP (ref 2–14)
NEUTROPHILS # BLD AUTO: 5.08 K/UL — SIGNIFICANT CHANGE UP (ref 1.8–7.4)
NEUTROPHILS NFR BLD AUTO: 62.6 % — SIGNIFICANT CHANGE UP (ref 43–77)
NITRITE UR-MCNC: NEGATIVE — SIGNIFICANT CHANGE UP
NRBC # BLD: 0 /100 WBCS — SIGNIFICANT CHANGE UP (ref 0–0)
NT-PROBNP SERPL-SCNC: 181 PG/ML — SIGNIFICANT CHANGE UP (ref 0–300)
PH UR: 7 — SIGNIFICANT CHANGE UP (ref 5–8)
PLATELET # BLD AUTO: 218 K/UL — SIGNIFICANT CHANGE UP (ref 150–400)
POTASSIUM SERPL-MCNC: 3.9 MMOL/L — SIGNIFICANT CHANGE UP (ref 3.5–5.3)
POTASSIUM SERPL-SCNC: 3.9 MMOL/L — SIGNIFICANT CHANGE UP (ref 3.5–5.3)
PROT SERPL-MCNC: 6.4 G/DL — SIGNIFICANT CHANGE UP (ref 6–8.3)
PROT UR-MCNC: NEGATIVE MG/DL — SIGNIFICANT CHANGE UP
PROTHROM AB SERPL-ACNC: 9.4 SEC — LOW (ref 9.5–13)
RBC # BLD: 3.48 M/UL — LOW (ref 3.8–5.2)
RBC # FLD: 13.2 % — SIGNIFICANT CHANGE UP (ref 10.3–14.5)
RBC CASTS # UR COMP ASSIST: 1 /HPF — SIGNIFICANT CHANGE UP (ref 0–4)
REVIEW: SIGNIFICANT CHANGE UP
SODIUM SERPL-SCNC: 138 MMOL/L — SIGNIFICANT CHANGE UP (ref 135–145)
SP GR SPEC: 1.01 — SIGNIFICANT CHANGE UP (ref 1–1.03)
SQUAMOUS # UR AUTO: 5 /HPF — SIGNIFICANT CHANGE UP (ref 0–5)
URATE SERPL-MCNC: 4.6 MG/DL — SIGNIFICANT CHANGE UP (ref 2.5–7)
UROBILINOGEN FLD QL: 0.2 MG/DL — SIGNIFICANT CHANGE UP (ref 0.2–1)
WBC # BLD: 8.11 K/UL — SIGNIFICANT CHANGE UP (ref 3.8–10.5)
WBC # FLD AUTO: 8.11 K/UL — SIGNIFICANT CHANGE UP (ref 3.8–10.5)
WBC UR QL: 7 /HPF — HIGH (ref 0–5)

## 2024-03-31 PROCEDURE — 99291 CRITICAL CARE FIRST HOUR: CPT

## 2024-03-31 RX ORDER — NIFEDIPINE 30 MG
30 TABLET, EXTENDED RELEASE 24 HR ORAL DAILY
Refills: 0 | Status: DISCONTINUED | OUTPATIENT
Start: 2024-03-31 | End: 2024-04-02

## 2024-03-31 RX ORDER — HEPARIN SODIUM 5000 [USP'U]/ML
5000 INJECTION INTRAVENOUS; SUBCUTANEOUS EVERY 12 HOURS
Refills: 0 | Status: DISCONTINUED | OUTPATIENT
Start: 2024-03-31 | End: 2024-04-02

## 2024-03-31 RX ORDER — MAGNESIUM SULFATE 500 MG/ML
2 VIAL (ML) INJECTION
Qty: 40 | Refills: 0 | Status: DISCONTINUED | OUTPATIENT
Start: 2024-03-31 | End: 2024-04-01

## 2024-03-31 RX ORDER — NIFEDIPINE 30 MG
10 TABLET, EXTENDED RELEASE 24 HR ORAL ONCE
Refills: 0 | Status: COMPLETED | OUTPATIENT
Start: 2024-03-31 | End: 2024-03-31

## 2024-03-31 RX ORDER — MAGNESIUM SULFATE 500 MG/ML
2 VIAL (ML) INJECTION
Qty: 40 | Refills: 0 | Status: DISCONTINUED | OUTPATIENT
Start: 2024-03-31 | End: 2024-03-31

## 2024-03-31 RX ORDER — MAGNESIUM SULFATE 500 MG/ML
4 VIAL (ML) INJECTION ONCE
Refills: 0 | Status: COMPLETED | OUTPATIENT
Start: 2024-03-31 | End: 2024-03-31

## 2024-03-31 RX ADMIN — Medication 300 GRAM(S): at 23:30

## 2024-03-31 RX ADMIN — Medication 10 MILLIGRAM(S): at 23:25

## 2024-03-31 NOTE — ED ADULT NURSE NOTE - OBJECTIVE STATEMENT
38 y.o female  c/o HTN and HA at home s/p c section x 4 days ago. Pt states noticed edema to b/l LE. Pt BP at home 170's/90's. Denies previous hx of HTN. Denies changes in vision, NV, CP, SOB

## 2024-03-31 NOTE — H&P ADULT - NSHPLABSRESULTS_GEN_ALL_CORE
Objective  – VS  T(C): 36.8 (03-31-24 @ 22:40)  HR: 50 (03-31-24 @ 22:55)  BP: 155/56 (03-31-24 @ 22:55)  RR: 19 (03-31-24 @ 22:55)  SpO2: 100% (03-31-24 @ 22:55)    Physical Exam  CV: RRR  Pulm: breathing comfortably on RA  Abd: gravid, nontender  Extr: moving all extremities with ease  – FS:   – Spec: pooling, nitrazine, ferning, bleeding,  (lesions if patient with HSV2 history)  – VE: //  – FHT: baseline 1, mod variability, +accels, -decels  – [place]: qmin  – EFW: _g by sono  – [place]: vertex

## 2024-03-31 NOTE — ED PROVIDER NOTE - PHYSICAL EXAMINATION
GENERAL: Awake, alert, NAD  HEENT: NC/AT, moist mucous membranes, EOMI  LUNGS: normal respiratory effot  CARDIAC: extremities warm and well perfused  ABDOMEN: Soft, non tender, non distended, no rebound, no guarding  Pfannenstiel incision clean, dry and intact  EXT: mild LE edema  NEURO: A&Ox3. Moving all extremities.  SKIN: Warm and dry. No rash.  PSYCH: Normal affect.

## 2024-03-31 NOTE — ED PROVIDER NOTE - OBJECTIVE STATEMENT
38-year-old female 3 days postop  delivery for fetal distress presenting with concern of elevated blood pressures at home.  Patient complained to her  about some pedal edema he monitored her vitals at home and found her to be hypertensive to the 180s systolic.  They called their doctor who told him to come to the ER to be evaluated.  Patient has been complaining of a mild headache.  No chest pain, shortness of breath, fever, chills, nausea, vomiting, trouble urinating, dysuria, or visual changes.  Patient did not have any complications during her pregnancy and was not taking medications for anything.

## 2024-03-31 NOTE — H&P ADULT - NSHPPHYSICALEXAM_GEN_ALL_CORE
Objective  – VS  T(C): 36.8 (03-31-24 @ 22:40)  HR: 50 (03-31-24 @ 22:55)  BP: 155/56 (03-31-24 @ 22:55)  RR: 19 (03-31-24 @ 22:55)  SpO2: 100% (03-31-24 @ 22:55)    BPs on tele monitor:  163/94 (1045p)  170/95 (1050p)  155/56 (1055p)  172/84 (1106p)  142/79 (1115p)      Physical Exam  CV: RRR  Pulm: breathing comfortably on RA  Abd: gravid, nontender, Pfannenstiel incision with Dermabond Prineo overlying  Extr: moving all extremities with ease, mild edema in bilateral ankles, symmetric, non-pitting edema

## 2024-03-31 NOTE — ED PROVIDER NOTE - ATTENDING CONTRIBUTION TO CARE
38F 3 days post- c/section for fetal distress here with elevated BP readings at home and BL pedal edema. Has baseline bradycardia. Denies any Vision changes, cp, sob, orthopnea, abd pain. Mild "tension-type" HA. BP elevated here, severe range. Will continue to monitor q15 min. NCAT EOMI normal heart and lung sounds, abd soft w erythematous micropapular rash, C/section scar CDI, ext wwp, no fnd, BL pedal edema. Concern for severe post-partum pre-eclampsia. OB consulted. Will give PO procardia. Likley to require magnesium, FU q15 min BP and OB recs. No HA or neuro changes to suggest ICH. No CP SOB or orthopnea to suggest cardiomyopathy.

## 2024-03-31 NOTE — ED ADULT NURSE NOTE - NSHOSCREENINGQ1_ED_ALL_ED
Pt crying nonstop since yesterday, inconsolable as per parents. Last BM this morning, abdomen soft, parents deny vomiting, good UOP. Pt calm now, smiling and interactive. Pt febrile now, parents given a gown to change pt in to. UTO BP, BCR. No PMHX, pt has 2month but not 4month vaccines. No

## 2024-03-31 NOTE — H&P ADULT - HISTORY OF PRESENT ILLNESS
INCOMPLETE    R2 Admission H&P    Subjective  HPI: 38y  who presents to the ED with elevated BPs at home and swelling in both feet  +FM. -LOF. -CTXs. -VB. Pt denies any other concerns.    – PNC: Denies prenatal issues. GBS pos/neg. EFW g by martin.  – OBHx:   – GynHx: denies fibroids, cysts, endometriosis, abnormal pap smears, STIs  – PMH: denies  – PSH: denies  – Psych: denies   – Social: denies   – Meds: PNV   – Allergies: NKDA  – Will accept blood transfusions? Yes R2 Admission H&P    Subjective  HPI: 38y , who is POD#4 from primary , who presents to the ED with elevated BPs at home and swelling in both feet. Also endorses mild "tension" HA as she describes. Denies SOB, CP, RUQ/epigastric pain, or vision changes.   Patient did not have any hypertensive disorders in pregnancy prior to today's visit.       OBHx: 3/27/24 primary  for cat 2 tracing (7#5)  GynHx: Small posterior fibroid. Denies hx of ovarian cysts, abnml PAP smears, STDs  MedHx: Denies hx of HTN, DM, asthma, thyroid problems, blood clots/bleeding problems, hx of blood transfusions  Meds: PNV  All: NKDA  PSHx: Denies hx of abdominal surgery  FHx: Denies hx of blood clots/bleeding problems  Social: Denies alcohol/tobacco/drug use in pregnancy  Psych: Denies hx of anxiety/depression

## 2024-03-31 NOTE — H&P ADULT - ASSESSMENT
Assessment  38y  GP @ w presents for _.     Plan  1. Admit to L+D. Routine Labs. IVF.  2. Expectant management/IOL w/ ___.  3. Fetus: cat 1 tracing. VTX. EFW _g by sono. Continuous EFM. Sono. No concerns.  4. Prenatal issues: none  5. GBS (+/-/unknown)  6. Pain: IV pain meds/epidural PRN    Plan per attending physician, . ____    Amy Abreu MD  PGY2 Assessment  38y   POD#4 from pLTCS admitted for PEC with SF.     1.  sPEC   - Mg bolus in ED  -c/w Magnesium for seizure prophylaxis  - Pro 10IR in ED and repeat BP in 20 min; follow OB hypertension protocol  - Pro 30XL  - HELLP pending      3.  Maternal Well-being  -Reg Diet  -HSQ, SCDs, ambulation for DVT prophylaxis        D/w Dr. Garfield Abreu MD  PGY2

## 2024-03-31 NOTE — ED PROVIDER NOTE - CLINICAL SUMMARY MEDICAL DECISION MAKING FREE TEXT BOX
38-year-old female 3 days postop  delivery for fetal distress presenting with concern of elevated blood pressures at home. Differential diagnosis includes but is not limited to pre-eclampsia, htn, hellp. Pt w/ elevated BP at home and here in ED, initial 160s, and 2nd bp 178 systolic. discussed likely need for treatment for pre-eclampsia. OB consulted. labs for eclampsia/HELLP ordered. will give IV magnesium and procardia as pt HR in the 50s. likely admit to OB service for magnesium infusion and bp monitoring.

## 2024-04-01 ENCOUNTER — TRANSCRIPTION ENCOUNTER (OUTPATIENT)
Age: 39
End: 2024-04-01

## 2024-04-01 PROBLEM — Z78.9 OTHER SPECIFIED HEALTH STATUS: Chronic | Status: ACTIVE | Noted: 2024-03-26

## 2024-04-01 LAB
ALBUMIN SERPL ELPH-MCNC: 3.6 G/DL — SIGNIFICANT CHANGE UP (ref 3.3–5)
ALP SERPL-CCNC: 209 U/L — HIGH (ref 40–120)
ALT FLD-CCNC: 38 U/L — SIGNIFICANT CHANGE UP (ref 10–45)
ANION GAP SERPL CALC-SCNC: 13 MMOL/L — SIGNIFICANT CHANGE UP (ref 5–17)
AST SERPL-CCNC: 29 U/L — SIGNIFICANT CHANGE UP (ref 10–40)
BASOPHILS # BLD AUTO: 0.02 K/UL — SIGNIFICANT CHANGE UP (ref 0–0.2)
BASOPHILS NFR BLD AUTO: 0.3 % — SIGNIFICANT CHANGE UP (ref 0–2)
BILIRUB SERPL-MCNC: 0.3 MG/DL — SIGNIFICANT CHANGE UP (ref 0.2–1.2)
BUN SERPL-MCNC: 9 MG/DL — SIGNIFICANT CHANGE UP (ref 7–23)
CALCIUM SERPL-MCNC: 8 MG/DL — LOW (ref 8.4–10.5)
CHLORIDE SERPL-SCNC: 104 MMOL/L — SIGNIFICANT CHANGE UP (ref 96–108)
CO2 SERPL-SCNC: 21 MMOL/L — LOW (ref 22–31)
CREAT SERPL-MCNC: 0.5 MG/DL — SIGNIFICANT CHANGE UP (ref 0.5–1.3)
EGFR: 124 ML/MIN/1.73M2 — SIGNIFICANT CHANGE UP
EOSINOPHIL # BLD AUTO: 0.16 K/UL — SIGNIFICANT CHANGE UP (ref 0–0.5)
EOSINOPHIL NFR BLD AUTO: 2.4 % — SIGNIFICANT CHANGE UP (ref 0–6)
GLUCOSE SERPL-MCNC: 99 MG/DL — SIGNIFICANT CHANGE UP (ref 70–99)
HCT VFR BLD CALC: 32.3 % — LOW (ref 34.5–45)
HGB BLD-MCNC: 10.9 G/DL — LOW (ref 11.5–15.5)
IMM GRANULOCYTES NFR BLD AUTO: 0.7 % — SIGNIFICANT CHANGE UP (ref 0–0.9)
LYMPHOCYTES # BLD AUTO: 1.56 K/UL — SIGNIFICANT CHANGE UP (ref 1–3.3)
LYMPHOCYTES # BLD AUTO: 23.1 % — SIGNIFICANT CHANGE UP (ref 13–44)
MAGNESIUM SERPL-MCNC: 5.6 MG/DL — HIGH (ref 1.6–2.6)
MAGNESIUM SERPL-MCNC: 6.1 MG/DL — HIGH (ref 1.6–2.6)
MAGNESIUM SERPL-MCNC: 6.6 MG/DL — HIGH (ref 1.6–2.6)
MCHC RBC-ENTMCNC: 30.7 PG — SIGNIFICANT CHANGE UP (ref 27–34)
MCHC RBC-ENTMCNC: 33.7 GM/DL — SIGNIFICANT CHANGE UP (ref 32–36)
MCV RBC AUTO: 91 FL — SIGNIFICANT CHANGE UP (ref 80–100)
MONOCYTES # BLD AUTO: 0.45 K/UL — SIGNIFICANT CHANGE UP (ref 0–0.9)
MONOCYTES NFR BLD AUTO: 6.7 % — SIGNIFICANT CHANGE UP (ref 2–14)
NEUTROPHILS # BLD AUTO: 4.5 K/UL — SIGNIFICANT CHANGE UP (ref 1.8–7.4)
NEUTROPHILS NFR BLD AUTO: 66.8 % — SIGNIFICANT CHANGE UP (ref 43–77)
NRBC # BLD: 0 /100 WBCS — SIGNIFICANT CHANGE UP (ref 0–0)
PLATELET # BLD AUTO: 227 K/UL — SIGNIFICANT CHANGE UP (ref 150–400)
POTASSIUM SERPL-MCNC: 3.5 MMOL/L — SIGNIFICANT CHANGE UP (ref 3.5–5.3)
POTASSIUM SERPL-SCNC: 3.5 MMOL/L — SIGNIFICANT CHANGE UP (ref 3.5–5.3)
PROT SERPL-MCNC: 6.2 G/DL — SIGNIFICANT CHANGE UP (ref 6–8.3)
RBC # BLD: 3.55 M/UL — LOW (ref 3.8–5.2)
RBC # FLD: 13 % — SIGNIFICANT CHANGE UP (ref 10.3–14.5)
SODIUM SERPL-SCNC: 138 MMOL/L — SIGNIFICANT CHANGE UP (ref 135–145)
WBC # BLD: 6.74 K/UL — SIGNIFICANT CHANGE UP (ref 3.8–10.5)
WBC # FLD AUTO: 6.74 K/UL — SIGNIFICANT CHANGE UP (ref 3.8–10.5)

## 2024-04-01 RX ORDER — MAGNESIUM SULFATE 500 MG/ML
1 VIAL (ML) INJECTION
Qty: 40 | Refills: 0 | Status: DISCONTINUED | OUTPATIENT
Start: 2024-04-01 | End: 2024-04-02

## 2024-04-01 RX ORDER — NIFEDIPINE 30 MG
1 TABLET, EXTENDED RELEASE 24 HR ORAL
Qty: 30 | Refills: 0
Start: 2024-04-01 | End: 2024-04-30

## 2024-04-01 RX ORDER — ACETAMINOPHEN 500 MG
1000 TABLET ORAL ONCE
Refills: 0 | Status: COMPLETED | OUTPATIENT
Start: 2024-04-01 | End: 2024-04-01

## 2024-04-01 RX ADMIN — Medication 400 MILLIGRAM(S): at 20:12

## 2024-04-01 RX ADMIN — Medication 1000 MILLIGRAM(S): at 20:50

## 2024-04-01 RX ADMIN — HEPARIN SODIUM 5000 UNIT(S): 5000 INJECTION INTRAVENOUS; SUBCUTANEOUS at 20:48

## 2024-04-01 RX ADMIN — HEPARIN SODIUM 5000 UNIT(S): 5000 INJECTION INTRAVENOUS; SUBCUTANEOUS at 08:43

## 2024-04-01 RX ADMIN — Medication 30 MILLIGRAM(S): at 01:06

## 2024-04-01 RX ADMIN — Medication 50 GM/HR: at 17:50

## 2024-04-01 RX ADMIN — Medication 50 GM/HR: at 01:06

## 2024-04-01 NOTE — DISCHARGE NOTE PROVIDER - NSDCCPCAREPLAN_GEN_ALL_CORE_FT
PRINCIPAL DISCHARGE DIAGNOSIS  Diagnosis: Pre-eclampsia, postpartum  Assessment and Plan of Treatment:

## 2024-04-01 NOTE — PROGRESS NOTE ADULT - ATTENDING COMMENTS
Ob Attg Note:  Pt is stable and doing well today.  VSS. afebrile  abd exam is normal and benign.  Agree w/ the daily progress note as entered.

## 2024-04-01 NOTE — DISCHARGE NOTE PROVIDER - HOSPITAL COURSE
Patient is POD#5 s/p LTCS, readmitted overnight for pre-eclampsia with severe features. Blood pressures well controlled s/p Procardia 10 IR and initiation of Procardia 30.

## 2024-04-01 NOTE — DISCHARGE NOTE PROVIDER - CARE PROVIDER_API CALL
Tom Merrill  Obstetrics and Gynecology  [address] [facility], [address]  [place] [address]  Phone: [phone]  Fax: [phone]  Follow Up Time:

## 2024-04-01 NOTE — PROGRESS NOTE ADULT - SUBJECTIVE AND OBJECTIVE BOX
S: Pain is well controlled. Mild HA noted on presentation has since resolved. Ambulating without difficulty. Denies lightheadedness/dizziness. She is voiding spontaneously. Denies CP, SOB, vision changes, RUQ pain, or significant LE edema. Lochia is minimal     O:   Vital Signs Last 24 Hrs  T(C): 36.7 (01 Apr 2024 03:15), Max: 36.8 (31 Mar 2024 22:40)  T(F): 98.1 (01 Apr 2024 03:15), Max: 98.2 (31 Mar 2024 22:40)  HR: 59 (01 Apr 2024 03:15) (50 - 66)  BP: 111/68 (01 Apr 2024 03:15) (111/68 - 176/83)  BP(mean): 105 (01 Apr 2024 02:45) (73 - 108)  RR: 18 (01 Apr 2024 03:15) (15 - 20)  SpO2: 98% (01 Apr 2024 03:15) (96% - 100%)    Parameters below as of 01 Apr 2024 03:15  Patient On (Oxygen Delivery Method): room air      Labs:  Blood type: O Positive  Rubella IgG: RPR: Negative                          10.7<L>   8.11 >-----------< 218    ( 03-31 @ 23:03 )             32.4<L>    03-31-24 @ 23:03      138  |  104  |  14  ----------------------------<  89  3.9   |  19<L>  |  0.62        Ca    9.4      31 Mar 2024 23:03  Mg     1.8     03-31    TPro  6.4  /  Alb  3.6  /  TBili  0.2  /  DBili  x   /  AST  32  /  ALT  40  /  AlkPhos  213<H>  03-31-24 @ 23:03          38y    PE:  General: NAD  Abdomen: No distended, non tender, incision c/d/i.  Extremities: No erythema, no pitting edema. SCDs in place.

## 2024-04-01 NOTE — LACTATION INITIAL EVALUATION - LACTATION INTERVENTIONS
Mother states she had been breastfeeding and formula feeding. Offered lactation consult & to bring her a pump and she declined. States she is going to use formula only for now. Aware of impact on her supply.

## 2024-04-02 ENCOUNTER — TRANSCRIPTION ENCOUNTER (OUTPATIENT)
Age: 39
End: 2024-04-02

## 2024-04-02 VITALS
RESPIRATION RATE: 18 BRPM | HEART RATE: 71 BPM | DIASTOLIC BLOOD PRESSURE: 77 MMHG | TEMPERATURE: 98 F | SYSTOLIC BLOOD PRESSURE: 118 MMHG | OXYGEN SATURATION: 96 %

## 2024-04-02 PROCEDURE — 83880 ASSAY OF NATRIURETIC PEPTIDE: CPT

## 2024-04-02 PROCEDURE — 36415 COLL VENOUS BLD VENIPUNCTURE: CPT

## 2024-04-02 PROCEDURE — 83615 LACTATE (LD) (LDH) ENZYME: CPT

## 2024-04-02 PROCEDURE — 99291 CRITICAL CARE FIRST HOUR: CPT

## 2024-04-02 PROCEDURE — 85025 COMPLETE CBC W/AUTO DIFF WBC: CPT

## 2024-04-02 PROCEDURE — 80053 COMPREHEN METABOLIC PANEL: CPT

## 2024-04-02 PROCEDURE — 85610 PROTHROMBIN TIME: CPT

## 2024-04-02 PROCEDURE — 96372 THER/PROPH/DIAG INJ SC/IM: CPT | Mod: XU

## 2024-04-02 PROCEDURE — 96374 THER/PROPH/DIAG INJ IV PUSH: CPT

## 2024-04-02 PROCEDURE — 85384 FIBRINOGEN ACTIVITY: CPT

## 2024-04-02 PROCEDURE — 84550 ASSAY OF BLOOD/URIC ACID: CPT

## 2024-04-02 PROCEDURE — 83735 ASSAY OF MAGNESIUM: CPT

## 2024-04-02 PROCEDURE — 81001 URINALYSIS AUTO W/SCOPE: CPT

## 2024-04-02 PROCEDURE — 85730 THROMBOPLASTIN TIME PARTIAL: CPT

## 2024-04-02 RX ORDER — ACETAMINOPHEN 500 MG
975 TABLET ORAL EVERY 6 HOURS
Refills: 0 | Status: DISCONTINUED | OUTPATIENT
Start: 2024-04-02 | End: 2024-04-02

## 2024-04-02 RX ADMIN — HEPARIN SODIUM 5000 UNIT(S): 5000 INJECTION INTRAVENOUS; SUBCUTANEOUS at 08:32

## 2024-04-02 RX ADMIN — Medication 975 MILLIGRAM(S): at 09:00

## 2024-04-02 RX ADMIN — Medication 975 MILLIGRAM(S): at 08:32

## 2024-04-02 RX ADMIN — Medication 30 MILLIGRAM(S): at 00:46

## 2024-04-02 NOTE — DISCHARGE NOTE NURSING/CASE MANAGEMENT/SOCIAL WORK - NSDCPEFALRISK_GEN_ALL_CORE
For information on Fall & Injury Prevention, visit: [email or web address] OR  [email or web address] OR  https://www.cdc.gov/steadi/patient.html

## 2024-04-02 NOTE — PROGRESS NOTE ADULT - ASSESSMENT
A/P: Patient is POD#6 s/p LTCS, readmitted overnight for pre-eclampsia with severe features. Blood pressures well controlled s/p Procardia 10 IR and initiation of Procardia 30     #SPEC   - s/p Magnesium x24h   - s/p Procardia 10   - c/w Procardia 30mg XL   - HELLP labs on admission wnl. Repeat this wnl   - BP well controlled ON - CTM     #Post partum   - Continue regular diet.  - Increase ambulation.  - HSQ/ SCDs for DVT ppx     JENNIFER Gaffney MD  PGY-3 
  A/P: Patient is POD#5 s/p LTCS, readmitted overnight for pre-eclampsia with severe features. Blood pressures well controlled s/p Procardia 10 IR and initiation of Procardia 30     #SPEC   - c/w Magnesium x24h   - s/p Procardia 10   - c/w Procardia 30mg XL   - HELLP labs on admission wnl. Repeat this AM   - BP well controlled ON - CTM     #Post partum   - Continue regular diet.  - Increase ambulation.  - HSQ/ SCDs for DVT ppx     JENNIFER Gaffney MD  PGY-3

## 2024-04-02 NOTE — DISCHARGE NOTE NURSING/CASE MANAGEMENT/SOCIAL WORK - PATIENT PORTAL LINK FT
You can access the FollowMyHealth Patient Portal offered by [facility] by registering at the following website: [email or web address]. By joining [employer]’s FollowMyHealth portal, you will also be able to view your health information using other applications (apps) compatible with our system.

## 2024-04-02 NOTE — PROGRESS NOTE ADULT - SUBJECTIVE AND OBJECTIVE BOX
S: Pt reports that she feels much better off of the magnesium. Able to get rest.  HERRERA improved. She is tolerating a regular diet,  passing flatus. Denies N/V. Ambulating without difficulty. Denies lightheadedness/dizziness. She is voiding spontaneously. Denies CP, SOB, RUQ pain, or significant LE edema. Lochia is minimal.     O:   Vital Signs Last 24 Hrs  T(C): 36.8 (02 Apr 2024 00:50), Max: 37 (01 Apr 2024 19:50)  T(F): 98.2 (02 Apr 2024 00:50), Max: 98.6 (01 Apr 2024 19:50)  HR: 71 (02 Apr 2024 00:50) (68 - 100)  BP: 127/84 (02 Apr 2024 00:50) (105/64 - 128/83)  BP(mean): --  RR: 18 (02 Apr 2024 00:50) (18 - 18)  SpO2: 96% (02 Apr 2024 00:50) (95% - 98%)    Parameters below as of 02 Apr 2024 00:50  Patient On (Oxygen Delivery Method): room air    Labs:  Blood type: O Positive  Rubella IgG: RPR: Negative                          10.9<L>   6.74 >-----------< 227    ( 04-01 @ 05:52 )             32.3<L>                        10.7<L>   8.11 >-----------< 218    ( 03-31 @ 23:03 )             32.4<L>    04-01-24 @ 05:52      138  |  104  |  9   ----------------------------<  99  3.5   |  21<L>  |  0.50    03-31-24 @ 23:03      138  |  104  |  14  ----------------------------<  89  3.9   |  19<L>  |  0.62        Ca    8.0<L>      01 Apr 2024 05:52  Ca    9.4      31 Mar 2024 23:03  Mg     6.6<H>     04-01  Mg     6.1<H>     04-01  Mg     5.6<H>     04-01  Mg     1.8     03-31    TPro  6.2  /  Alb  3.6  /  TBili  0.3  /  DBili  x   /  AST  29  /  ALT  38  /  AlkPhos  209<H>  04-01-24 @ 05:52  TPro  6.4  /  Alb  3.6  /  TBili  0.2  /  DBili  x   /  AST  32  /  ALT  40  /  AlkPhos  213<H>  03-31-24 @ 23:03      PE:  General: NAD  Abdomen: Mildly distended, appropriately tender, incision c/d/i.  Extremities: No erythema, no pitting edema. SCDs in place.

## 2024-04-03 ENCOUNTER — NON-APPOINTMENT (OUTPATIENT)
Age: 39
End: 2024-04-03

## 2024-04-29 ENCOUNTER — APPOINTMENT (OUTPATIENT)
Dept: CARDIOLOGY | Facility: CLINIC | Age: 39
End: 2024-04-29
Payer: COMMERCIAL

## 2024-04-29 DIAGNOSIS — Z82.49 FAMILY HISTORY OF ISCHEMIC HEART DISEASE AND OTHER DISEASES OF THE CIRCULATORY SYSTEM: ICD-10-CM

## 2024-04-29 PROCEDURE — 99204 OFFICE O/P NEW MOD 45 MIN: CPT

## 2024-05-05 PROBLEM — Z82.49 FAMILY HISTORY OF ESSENTIAL HYPERTENSION: Status: ACTIVE | Noted: 2024-05-05

## 2024-05-05 NOTE — ASSESSMENT
[FreeTextEntry1] : Ms. Charles is a 39 year old that presents [facility] for a cardiovascular evaluation postpartum.   Patient delivered a baby on 2024 by emergency  section due to fetal heart decelerations. She was discharged to home and returned two days later with severely elevated blood pressures (180/90) and diagnosed with preeclampsia.   She was treated with IV Magnesium for seizure prophylaxis and started on Procardia 30 mg daily.   +Family history is paternal hypertension   Patient reports that her b pressure today is:  124/82 on Procardia ER 30 mg QOD.  She reports feeling well and denies any issues with shortness of breath, chest discomfort or palpitations.    #Gestational/ postpartum hypertension   Blood pressure reported today to be in good control   Requested BP log for one week and will titrate medication as needed   Continue on Nifedipine ER 30 mg every other day   Patient is to watch out for signs and symptoms of hypotension-> hold Nifedipine for systolic pressure below 115.   Watch sodium intake and increase fluid intake.  #Adverse Cardiovascular Risk Factor  Personal history of postpartum hypertension Family history of hypertension  Recommending a baseline cardiovascular evaluation 3 months postpartum to assess risk In-office physical examination and 12 lead EKG Echocardiogram to assess cardiac structure and function Exercise stress testing to assess functional status Full blood work panel:  CBC,CMP, Hgb A1C, TSH, Lipid profile  - Encouraged patient to participate in healthy exercise ( when cleared by OB) and eating habits, focusing on a Mediterranean style of eating and aiming for the recommended 150 minutes per week of moderate physical activity.  - Encouraged the patient to find healthy outlets and coping mechanisms to help manage stress, such as physical activity/exercise, reducing workload if possible, spending time with family and friends, engaging in an enjoyable hobby, or using meditation or mindfulness techniques.  I spent 45 minutes evaluating patient's history, family medical history and blood pressure management, ordering tests and providing documentation.

## 2024-05-05 NOTE — HISTORY OF PRESENT ILLNESS
[Home] : at home, [unfilled] , at the time of the visit. [Medical Office: ([facility])___] : at the medical office located in  [Verbal consent obtained from patient] : the patient, [unfilled] [FreeTextEntry1] : Ms. Charles is a 39 year old that presents [facility] for a cardiovascular evaluation postpartum.   Patient delivered a baby on 2024 by emergency  section due to fetal heart decelerations. She was discharged to home and returned two days later with severely elevated blood pressures (180/90) and diagnosed with preeclampsia.   She was treated with IV Magnesium for seizure prophylaxis and started on Procardia 30 mg daily.   +Family history is paternal hypertension   Patient reports that her b pressure today is:  124/82 on Procardia ER 30 mg QOD.  She reports feeling well and denies any issues with shortness of breath, chest discomfort or palpitations.

## 2024-07-24 ENCOUNTER — APPOINTMENT (OUTPATIENT)
Dept: CARDIOLOGY | Facility: CLINIC | Age: 39
End: 2024-07-24

## 2024-07-24 ENCOUNTER — APPOINTMENT (OUTPATIENT)
Dept: CV DIAGNOSITCS | Facility: HOSPITAL | Age: 39
End: 2024-07-24

## 2024-07-24 NOTE — HISTORY OF PRESENT ILLNESS
[FreeTextEntry1] : Ms. Charles is a 39 year old that presents to the [facility] for a cardiovascular evaluation postpartum.   Patient delivered a baby on 2024 by emergency  section due to fetal heart decelerations. She was discharged to home and returned two days later with severely elevated blood pressures (180/90) and diagnosed with preeclampsia.   She was treated with IV Magnesium for seizure prophylaxis and started on Procardia 30 mg daily.   +Family history is paternal hypertension   Patient reports that her b pressure today is:  124/82 on Procardia ER 30 mg QOD.  She reports feeling well and denies any issues with shortness of breath, chest discomfort or palpitations.

## 2024-09-10 NOTE — PROGRESS NOTE ADULT - SUBJECTIVE AND OBJECTIVE BOX
Day 1 of Anesthesia Pain Management Service    SUBJECTIVE: Doing ok  Pain Scale Score:          [X] Refer to charted pain scores    THERAPY:  s/p   2  mg PF epidural morphine on 3\27\2024       MEDICATIONS  (STANDING):  acetaminophen Tablet  975 milliGRAM(s) Oral <User Schedule>  citric acid/sodium citrate Solution 30 milliLiter(s) Oral once  dextrose 5% + lactated ringers. 1000 milliLiter(s) (125 mL/Hr) IV Continuous <Continuous>  diphtheria/tetanus/pertussis (acellular) Vaccine (Adacel) 0.5 milliLiter(s) IntraMuscular once  famotidine Injectable 20 milliGRAM(s) IV Push once  heparin   Injectable 5000 Unit(s) SubCutaneous every 12 hours  ibuprofen  Tablet. 600 milliGRAM(s) Oral every 6 hours  ketorolac   Injectable 30 milliGRAM(s) IV Push every 6 hours  lactated ringers Bolus 500 milliLiter(s) IV Bolus once  lactated ringers. 1000 milliLiter(s) (125 mL/Hr) IV Continuous <Continuous>  oxytocin Infusion 333.333 milliUNIT(s)/Min (1000 mL/Hr) IV Continuous <Continuous>  oxytocin Infusion 333.333 milliUNIT(s)/Min (1000 mL/Hr) IV Continuous <Continuous>    MEDICATIONS  (PRN):  diphenhydrAMINE 25 milliGRAM(s) Oral every 6 hours PRN Pruritus  lanolin Ointment 1 Application(s) Topical every 6 hours PRN Sore Nipples  magnesium hydroxide Suspension 30 milliLiter(s) Oral two times a day PRN Constipation  oxyCODONE    IR 5 milliGRAM(s) Oral every 3 hours PRN Moderate to Severe Pain (4-10)  oxyCODONE    IR 5 milliGRAM(s) Oral once PRN Moderate to Severe Pain (4-10)  simethicone 80 milliGRAM(s) Chew every 4 hours PRN Gas      OBJECTIVE:    Sedation:        	[X] Alert	 [ ] Drowsy	[ ] Arousable      [ ] Asleep       [ ] Unresponsive    Side Effects:	[X] None 	[ ] Nausea	[ ] Vomiting         [ ] Pruritus  		[ ] Weakness            [ ] Numbness	          [ ] Other:    Vital Signs Last 24 Hrs  T(C): 36.7 (28 Mar 2024 05:25), Max: 36.9 (27 Mar 2024 21:25)  T(F): 98.1 (28 Mar 2024 05:25), Max: 98.4 (27 Mar 2024 21:25)  HR: 65 (28 Mar 2024 05:25) (56 - 65)  BP: 99/63 (28 Mar 2024 05:25) (99/63 - 120/77)  BP(mean): --  RR: 18 (28 Mar 2024 05:25) (18 - 18)  SpO2: 99% (28 Mar 2024 05:25) (95% - 99%)    Parameters below as of 28 Mar 2024 05:25  Patient On (Oxygen Delivery Method): room air        ASSESSMENT/ PLAN  [X] Patient transitioned to prn analgesics  [X] Pain management per primary service, pain service to sign off   [X]Documentation and Verification of current medications [Time Spent: ___ minutes] : I have spent [unfilled] minutes of time on the encounter which excludes teaching and separately reported services.

## 2024-10-03 ENCOUNTER — NON-APPOINTMENT (OUTPATIENT)
Age: 39
End: 2024-10-03

## 2024-10-04 ENCOUNTER — APPOINTMENT (OUTPATIENT)
Dept: CARDIOLOGY | Facility: CLINIC | Age: 39
End: 2024-10-04
Payer: COMMERCIAL

## 2024-10-04 VITALS
HEIGHT: 60 IN | WEIGHT: 127 LBS | BODY MASS INDEX: 24.94 KG/M2 | HEART RATE: 70 BPM | DIASTOLIC BLOOD PRESSURE: 83 MMHG | SYSTOLIC BLOOD PRESSURE: 117 MMHG | OXYGEN SATURATION: 99 %

## 2024-10-04 DIAGNOSIS — O09.299 SUPERVISION OF PREGNANCY WITH OTHER POOR REPRODUCTIVE OR OBSTETRIC HISTORY, UNSPECIFIED TRIMESTER: ICD-10-CM

## 2024-10-04 DIAGNOSIS — R06.00 DYSPNEA, UNSPECIFIED: ICD-10-CM

## 2024-10-04 PROCEDURE — 99204 OFFICE O/P NEW MOD 45 MIN: CPT | Mod: 25

## 2024-10-04 PROCEDURE — 93000 ELECTROCARDIOGRAM COMPLETE: CPT

## 2024-10-05 LAB
25(OH)D3 SERPL-MCNC: 32.4 NG/ML
ANION GAP SERPL CALC-SCNC: 16 MMOL/L
BUN SERPL-MCNC: 14 MG/DL
CALCIUM SERPL-MCNC: 10 MG/DL
CHLORIDE SERPL-SCNC: 99 MMOL/L
CHOLEST SERPL-MCNC: 290 MG/DL
CO2 SERPL-SCNC: 20 MMOL/L
CREAT SERPL-MCNC: 0.7 MG/DL
EGFR: 113 ML/MIN/1.73M2
ESTIMATED AVERAGE GLUCOSE: 105 MG/DL
GLUCOSE SERPL-MCNC: 87 MG/DL
HBA1C MFR BLD HPLC: 5.3 %
HCT VFR BLD CALC: 38.5 %
HDLC SERPL-MCNC: 95 MG/DL
HGB BLD-MCNC: 13.1 G/DL
LDLC SERPL CALC-MCNC: 169 MG/DL
MCHC RBC-ENTMCNC: 29.7 PG
MCHC RBC-ENTMCNC: 34 GM/DL
MCV RBC AUTO: 87.3 FL
NONHDLC SERPL-MCNC: 194 MG/DL
PLATELET # BLD AUTO: 279 K/UL
POTASSIUM SERPL-SCNC: 4.4 MMOL/L
RBC # BLD: 4.41 M/UL
RBC # FLD: 12.8 %
SODIUM SERPL-SCNC: 135 MMOL/L
TRIGL SERPL-MCNC: 149 MG/DL
TSH SERPL-ACNC: 1.6 UIU/ML
WBC # FLD AUTO: 7.34 K/UL

## 2024-10-05 NOTE — END OF VISIT
[Time Spent: ___ minutes] : I have spent [unfilled] minutes of time on the encounter which excludes teaching and separately reported services. [FreeTextEntry3] : I, Dr. America Bolden, personally performed the evaluation and management (E/M) services for this established patient who presents today with (a) new problem(s)/exacerbation of (an) existing condition(s).  That E/M includes conducting the examination, assessing all new/exacerbated conditions, and establishing a new plan of care.  Today, my ACP, was here to observe my evaluation and management services for this new problem/exacerbated condition to be followed going forward.

## 2024-10-05 NOTE — DISCUSSION/SUMMARY
[EKG obtained to assist in diagnosis and management of assessed problem(s)] : EKG obtained to assist in diagnosis and management of assessed problem(s) [FreeTextEntry1] : In summary, Mrs. De Anda is a 39 year old that presents today for 6 month follow up after established care 3/2024 for a cardiovascular postpartum eclampsia.  Patient delivered a baby on 2024 by emergency  section due to fetal heart decelerations. She was discharged to home and returned two days later with severely elevated blood pressures (180/90) and diagnosed with preeclampsia.   She was treated with IV Magnesium for seizure prophylaxis and started on Procardia 30 mg daily.  Procardia discontinued approximately 4 weeks post partum, BP well controlled at home.  She reports feeling well and denies any issues with shortness of breath, chest discomfort or palpitations.  No edema Activity: mostly sedentary, maintains heart healthy cooking Hydrates poorly  BP normotensive EKG;NSR, no ischemia   #Adverse Cardiovascular Risk Factors # CVD risk factor modification and general lifestyle- Personal history of prior preclampsia  Family history of HTN, -Baseline Echocardiogram -Exercise stress test -Labwork including Lipoprotein A, Lipid profile, CBC, CMP, HBA1C  Encouraged the patient to monitor blood pressure at home, keep a log, and report results back to us for evaluation. Based on results, we will adjust the regimen as necessary.  I have explained risks and importance of lifestyle modification in and after pregnancy and in anticipation of any future pregnancies. Preeclampsia raises the risk CVD by 2-fold compared to women without preeclampsia in pregnancy. We discussed importance of BP monitoring, (lipid, OGTT) measurement at 3 months postpartum, and the following: - heart healthy diet (low in animal sources of saturated fat, refined sugars, ultra processed foods, sugar-sweetened beverages, high in healthy fat, whole grains and fruits and vegetables - exercise (150 minutes per week)- - decrease sedentary behaviors.  follow up with test results and in 6 months

## 2024-10-05 NOTE — HISTORY OF PRESENT ILLNESS
[FreeTextEntry1] : Ms. Charles is a 39 year old that presents today for 6 month follow up after established care 3/2024 for a cardiovascular postpartum eclampsia.   Patient delivered a baby on 2024 by emergency  section due to fetal heart decelerations. She was discharged to home and returned two days later with severely elevated blood pressures (180/90) and diagnosed with preeclampsia.   She was treated with IV Magnesium for seizure prophylaxis and started on Procardia 30 mg daily.  Procardia discontinued approximately 4 weeks post partum, BP well controlled at home.   +Family history is paternal hypertension  She reports feeling well and denies any issues with shortness of breath, chest discomfort or palpitations.  No edema Activity: mostly sedentary, maintains heart healthy cooking Hydrates poorly  BP today: 117/83, HR 70 EKG;NSR, no ischemia  TTE and Stress test, lab work all ordered today

## 2024-10-10 ENCOUNTER — OUTPATIENT (OUTPATIENT)
Dept: OUTPATIENT SERVICES | Facility: HOSPITAL | Age: 39
LOS: 1 days | End: 2024-10-10
Payer: COMMERCIAL

## 2024-10-10 ENCOUNTER — APPOINTMENT (OUTPATIENT)
Dept: CV DIAGNOSTICS | Facility: HOSPITAL | Age: 39
End: 2024-10-10

## 2024-10-10 ENCOUNTER — RESULT REVIEW (OUTPATIENT)
Age: 39
End: 2024-10-10

## 2024-10-10 DIAGNOSIS — Z98.891 HISTORY OF UTERINE SCAR FROM PREVIOUS SURGERY: Chronic | ICD-10-CM

## 2024-10-10 DIAGNOSIS — O09.299 SUPERVISION OF PREGNANCY WITH OTHER POOR REPRODUCTIVE OR OBSTETRIC HISTORY, UNSPECIFIED TRIMESTER: ICD-10-CM

## 2024-10-10 PROCEDURE — 93016 CV STRESS TEST SUPVJ ONLY: CPT

## 2024-10-10 PROCEDURE — 93017 CV STRESS TEST TRACING ONLY: CPT

## 2024-10-10 PROCEDURE — 93018 CV STRESS TEST I&R ONLY: CPT

## 2024-10-21 ENCOUNTER — APPOINTMENT (OUTPATIENT)
Dept: CARDIOLOGY | Facility: CLINIC | Age: 39
End: 2024-10-21
Payer: COMMERCIAL

## 2024-10-21 PROCEDURE — 93306 TTE W/DOPPLER COMPLETE: CPT

## 2024-10-22 ENCOUNTER — APPOINTMENT (OUTPATIENT)
Dept: CARDIOLOGY | Facility: CLINIC | Age: 39
End: 2024-10-22
Payer: COMMERCIAL

## 2024-10-22 PROCEDURE — 99214 OFFICE O/P EST MOD 30 MIN: CPT

## 2025-01-06 ENCOUNTER — ASOB RESULT (OUTPATIENT)
Age: 40
End: 2025-01-06

## 2025-01-06 ENCOUNTER — APPOINTMENT (OUTPATIENT)
Dept: ANTEPARTUM | Facility: CLINIC | Age: 40
End: 2025-01-06
Payer: COMMERCIAL

## 2025-01-06 PROCEDURE — 76801 OB US < 14 WKS SINGLE FETUS: CPT

## 2025-01-13 ENCOUNTER — APPOINTMENT (OUTPATIENT)
Dept: ANTEPARTUM | Facility: CLINIC | Age: 40
End: 2025-01-13
Payer: COMMERCIAL

## 2025-01-13 ENCOUNTER — ASOB RESULT (OUTPATIENT)
Age: 40
End: 2025-01-13

## 2025-01-13 PROCEDURE — 76813 OB US NUCHAL MEAS 1 GEST: CPT

## 2025-03-10 ENCOUNTER — ASOB RESULT (OUTPATIENT)
Age: 40
End: 2025-03-10

## 2025-03-10 ENCOUNTER — APPOINTMENT (OUTPATIENT)
Dept: ANTEPARTUM | Facility: CLINIC | Age: 40
End: 2025-03-10
Payer: COMMERCIAL

## 2025-03-10 PROCEDURE — 76811 OB US DETAILED SNGL FETUS: CPT

## 2025-03-17 ENCOUNTER — APPOINTMENT (OUTPATIENT)
Dept: ANTEPARTUM | Facility: CLINIC | Age: 40
End: 2025-03-17
Payer: COMMERCIAL

## 2025-03-17 ENCOUNTER — ASOB RESULT (OUTPATIENT)
Age: 40
End: 2025-03-17

## 2025-03-17 PROCEDURE — 76816 OB US FOLLOW-UP PER FETUS: CPT

## 2025-03-18 DIAGNOSIS — O35.9XX0 MATERNAL CARE FOR (SUSPECTED) FETAL ABNORMALITY AND DAMAGE, UNSPECIFIED, NOT APPLICABLE OR UNSPECIFIED: ICD-10-CM

## 2025-03-26 ENCOUNTER — APPOINTMENT (OUTPATIENT)
Dept: PEDIATRIC CARDIOLOGY | Facility: CLINIC | Age: 40
End: 2025-03-26
Payer: COMMERCIAL

## 2025-03-26 PROCEDURE — 99202 OFFICE O/P NEW SF 15 MIN: CPT | Mod: 25

## 2025-03-26 PROCEDURE — 76827 ECHO EXAM OF FETAL HEART: CPT

## 2025-03-26 PROCEDURE — 93325 DOPPLER ECHO COLOR FLOW MAPG: CPT | Mod: 59

## 2025-03-26 PROCEDURE — 76820 UMBILICAL ARTERY ECHO: CPT

## 2025-03-26 PROCEDURE — 76825 ECHO EXAM OF FETAL HEART: CPT

## 2025-03-26 PROCEDURE — 76821 MIDDLE CEREBRAL ARTERY ECHO: CPT

## 2025-04-07 ENCOUNTER — APPOINTMENT (OUTPATIENT)
Dept: CARDIOLOGY | Facility: CLINIC | Age: 40
End: 2025-04-07

## 2025-04-14 ENCOUNTER — APPOINTMENT (OUTPATIENT)
Dept: PEDIATRIC CARDIOLOGY | Facility: CLINIC | Age: 40
End: 2025-04-14

## 2025-06-02 ENCOUNTER — APPOINTMENT (OUTPATIENT)
Dept: ANTEPARTUM | Facility: CLINIC | Age: 40
End: 2025-06-02
Payer: COMMERCIAL

## 2025-06-02 ENCOUNTER — ASOB RESULT (OUTPATIENT)
Age: 40
End: 2025-06-02

## 2025-06-02 PROCEDURE — 76816 OB US FOLLOW-UP PER FETUS: CPT
